# Patient Record
Sex: FEMALE | Race: WHITE | NOT HISPANIC OR LATINO
[De-identification: names, ages, dates, MRNs, and addresses within clinical notes are randomized per-mention and may not be internally consistent; named-entity substitution may affect disease eponyms.]

---

## 2020-03-09 ENCOUNTER — LABORATORY RESULT (OUTPATIENT)
Age: 55
End: 2020-03-09

## 2020-03-09 ENCOUNTER — APPOINTMENT (OUTPATIENT)
Dept: INTERNAL MEDICINE | Facility: CLINIC | Age: 55
End: 2020-03-09
Payer: COMMERCIAL

## 2020-03-09 ENCOUNTER — APPOINTMENT (OUTPATIENT)
Dept: INTERNAL MEDICINE | Facility: CLINIC | Age: 55
End: 2020-03-09

## 2020-03-09 VITALS
WEIGHT: 115 LBS | RESPIRATION RATE: 14 BRPM | HEIGHT: 65 IN | OXYGEN SATURATION: 97 % | DIASTOLIC BLOOD PRESSURE: 70 MMHG | SYSTOLIC BLOOD PRESSURE: 100 MMHG | HEART RATE: 97 BPM | BODY MASS INDEX: 19.16 KG/M2

## 2020-03-09 DIAGNOSIS — R63.4 ABNORMAL WEIGHT LOSS: ICD-10-CM

## 2020-03-09 DIAGNOSIS — G35 MULTIPLE SCLEROSIS: ICD-10-CM

## 2020-03-09 PROBLEM — Z00.00 ENCOUNTER FOR PREVENTIVE HEALTH EXAMINATION: Status: ACTIVE | Noted: 2020-03-09

## 2020-03-09 PROCEDURE — G0444 DEPRESSION SCREEN ANNUAL: CPT

## 2020-03-09 PROCEDURE — 99204 OFFICE O/P NEW MOD 45 MIN: CPT | Mod: 25

## 2020-03-09 PROCEDURE — 36415 COLL VENOUS BLD VENIPUNCTURE: CPT

## 2020-03-10 NOTE — REVIEW OF SYSTEMS
[Fever] : no fever [Vision Problems] : no vision problems [Nasal Discharge] : no nasal discharge [Chest Pain] : no chest pain [Shortness Of Breath] : no shortness of breath [Abdominal Pain] : no abdominal pain [Nausea] : no nausea [Constipation] : no constipation [Diarrhea] : diarrhea [Vomiting] : no vomiting [Skin Rash] : no skin rash [Headache] : no headache

## 2020-03-10 NOTE — PHYSICAL EXAM
[No Acute Distress] : no acute distress [Well Nourished] : well nourished [Well Developed] : well developed [Well-Appearing] : well-appearing [Normal Sclera/Conjunctiva] : normal sclera/conjunctiva [Normal Outer Ear/Nose] : the outer ears and nose were normal in appearance [Normal Oropharynx] : the oropharynx was normal [Normal TMs] : both tympanic membranes were normal [No Lymphadenopathy] : no lymphadenopathy [Supple] : supple [Thyroid Normal, No Nodules] : the thyroid was normal and there were no nodules present [No Respiratory Distress] : no respiratory distress  [No Accessory Muscle Use] : no accessory muscle use [Clear to Auscultation] : lungs were clear to auscultation bilaterally [Normal Rate] : normal rate  [Regular Rhythm] : with a regular rhythm [Normal S1, S2] : normal S1 and S2 [No Murmur] : no murmur heard [No Edema] : there was no peripheral edema [Normal Supraclavicular Nodes] : no supraclavicular lymphadenopathy [Normal Posterior Cervical Nodes] : no posterior cervical lymphadenopathy [Normal Anterior Cervical Nodes] : no anterior cervical lymphadenopathy [Normal Inguinal Nodes] : no inguinal lymphadenopathy [No Joint Swelling] : no joint swelling [No Rash] : no rash [Normal Gait] : normal gait [Speech Grossly Normal] : speech grossly normal [Memory Grossly Normal] : memory grossly normal [Normal Affect] : the affect was normal [Alert and Oriented x3] : oriented to person, place, and time [Normal Mood] : the mood was normal [Normal Insight/Judgement] : insight and judgment were intact

## 2020-03-10 NOTE — HEALTH RISK ASSESSMENT
[] : No [No] : In the past 12 months have you used drugs other than those required for medical reasons? No [No falls in past year] : Patient reported no falls in the past year [0] : 2) Feeling down, depressed, or hopeless: Not at all (0) [de-identified] : regular [[id number]] : 0

## 2020-03-10 NOTE — HISTORY OF PRESENT ILLNESS
[FreeTextEntry8] : JOSE LUIS BRADLEY is a 53 yo woman here for a first visit to discuss weight loss.  The patient reports that in the past 6 weeks, she has lost approximately 15 pounds.  She usually weighs approximately 130 pounds.  The patient reports decreased appetite and oral intake.  The patient denies headache, chest pain, sob, abdominal pain, black stool, blood in the stool, problems with bleeding.\par \par The patient was diagnosed with multiple sclerosis at age 35 after having double vision.  She sees neurologist Dr Keaton aMdrid.  Her last MRI brain was in approximately 10/19 and it did show some changes.  She is considering IV steroids.  She was on neurontin in the past\par \par Recently had labs with GI. Pt brings in copy for review\par \par The patient has a sleep disorder. In the past, it was improved with valium 10 mg qhs.  In recent times, she is having more difficulty sleeping\par \par The patient reports gyn is UTD.  The patient reports colonoscopy is utd with Dr Winn < 10 years ago.  The patient is due for mammogram\par \par The patient is  with 2 children. 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  Stopped working a few years ago so help care for her teenage son with autism

## 2020-03-11 ENCOUNTER — APPOINTMENT (OUTPATIENT)
Dept: ENDOCRINOLOGY | Facility: CLINIC | Age: 55
End: 2020-03-11
Payer: COMMERCIAL

## 2020-03-11 VITALS
HEIGHT: 65 IN | DIASTOLIC BLOOD PRESSURE: 72 MMHG | BODY MASS INDEX: 20.66 KG/M2 | OXYGEN SATURATION: 96 % | WEIGHT: 124 LBS | TEMPERATURE: 98.3 F | HEART RATE: 108 BPM | SYSTOLIC BLOOD PRESSURE: 118 MMHG

## 2020-03-11 DIAGNOSIS — R00.2 PALPITATIONS: ICD-10-CM

## 2020-03-11 DIAGNOSIS — R63.4 ABNORMAL WEIGHT LOSS: ICD-10-CM

## 2020-03-11 DIAGNOSIS — R45.89 OTHER SYMPTOMS AND SIGNS INVOLVING EMOTIONAL STATE: ICD-10-CM

## 2020-03-11 PROCEDURE — 99205 OFFICE O/P NEW HI 60 MIN: CPT

## 2020-03-16 LAB
B BURGDOR IGG+IGM SER QL IB: NORMAL
CORTICOSTEROID BIND GLOBULIN: 2.3 MG/DL
CORTIS SERPL-MCNC: 14 UG/DL
CORTISOL, FREE: 1.4 UG/DL
CRP SERPL-MCNC: <0.1 MG/DL
ERYTHROCYTE [SEDIMENTATION RATE] IN BLOOD BY WESTERGREN METHOD: 7 MM/HR
PFCX: 10 %

## 2020-03-20 ENCOUNTER — APPOINTMENT (OUTPATIENT)
Dept: INTERNAL MEDICINE | Facility: CLINIC | Age: 55
End: 2020-03-20

## 2020-03-31 PROBLEM — R63.4 WEIGHT LOSS: Status: ACTIVE | Noted: 2020-03-11

## 2020-03-31 PROBLEM — R45.89 FEELING ANXIOUS: Status: ACTIVE | Noted: 2020-03-31

## 2020-03-31 NOTE — HISTORY OF PRESENT ILLNESS
[FreeTextEntry1] : Ms. RAM  is a 54 year  year old female  who presents for initial endocrine evaluation. \par She has noted that she is waking up in the middle of the nighjt as if scared -"pulse of adrenalanin"  Tries to calm down-will take  valium.\par Has MS \par Lost 15 lbs in 4 weeks. 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  Help during the  day not so at night.\par \par The valium had been increased to2 daily even prior to  her episodesepisodes   For relaxation.\par Last 2 days pain rt lower ribs.\par Senies trauma\par Saw pmd who was a heme oncol-Dr Gonzalez  did ct chest abd pelvis.\par MS hx x 17 yrs.\par Adrenaline rush present this am too.It took 1/2 hr to settle down to normal.\par Recent cmp neg.TSH normal.\par Appetite down\par Eating a lot less than usual.\par \par \par \par

## 2021-01-04 ENCOUNTER — APPOINTMENT (OUTPATIENT)
Dept: UROGYNECOLOGY | Facility: CLINIC | Age: 56
End: 2021-01-04
Payer: COMMERCIAL

## 2021-01-04 ENCOUNTER — RESULT CHARGE (OUTPATIENT)
Age: 56
End: 2021-01-04

## 2021-01-04 VITALS
SYSTOLIC BLOOD PRESSURE: 102 MMHG | HEIGHT: 65 IN | DIASTOLIC BLOOD PRESSURE: 69 MMHG | BODY MASS INDEX: 18.16 KG/M2 | WEIGHT: 109 LBS

## 2021-01-04 DIAGNOSIS — R33.9 RETENTION OF URINE, UNSPECIFIED: ICD-10-CM

## 2021-01-04 DIAGNOSIS — R32 UNSPECIFIED URINARY INCONTINENCE: ICD-10-CM

## 2021-01-04 DIAGNOSIS — R39.13 SPLITTING OF URINARY STREAM: ICD-10-CM

## 2021-01-04 DIAGNOSIS — R39.15 URGENCY OF URINATION: ICD-10-CM

## 2021-01-04 DIAGNOSIS — N81.4 UTEROVAGINAL PROLAPSE, UNSPECIFIED: ICD-10-CM

## 2021-01-04 LAB
BILIRUB UR QL STRIP: NEGATIVE
CLARITY UR: CLEAR
COLLECTION METHOD: NORMAL
GLUCOSE UR-MCNC: NEGATIVE
HCG UR QL: 0.2 EU/DL
HGB UR QL STRIP.AUTO: NORMAL
KETONES UR-MCNC: NEGATIVE
LEUKOCYTE ESTERASE UR QL STRIP: NORMAL
NITRITE UR QL STRIP: NEGATIVE
PH UR STRIP: 5.5
PROT UR STRIP-MCNC: NEGATIVE
SP GR UR STRIP: 1.02

## 2021-01-04 PROCEDURE — 99204 OFFICE O/P NEW MOD 45 MIN: CPT | Mod: 25

## 2021-01-04 PROCEDURE — 99072 ADDL SUPL MATRL&STAF TM PHE: CPT

## 2021-01-04 NOTE — OB HISTORY
[Vaginal ___] : [unfilled] vaginal delivery(s) [Definite ___ (Date)] : the last menstrual period was [unfilled] [Last Pap Smear ___] : date of last pap smear was on [unfilled] [Abstaining d/t Present Problem] : abstaining due to present problem [Abnormal Pap Smear] : normal pap smear [Taking Estrogens] : is not taking estrogen replacement [Sexually Active] : is not sexually active [FreeTextEntry1] : Largest baby weighed 7#4oz.

## 2021-01-04 NOTE — HISTORY OF PRESENT ILLNESS
[FreeTextEntry1] : 54 yo  female with complaints of prolapse. This has been an ongoing issue for several years. Saw me about 5 years ago. The prolapse has been stable until the last year when it got worse. She also had an annual exam about 4 months ago and thinks the prolapse is even works since then. Feels discomfort with the prolapse. It is becoming very difficulty for her to reduce the prolapse now. Feels she is difficult emptying bladder. She is no longer leaking with sneeze or cough. Feels she voids frequently but she relates it to the amount she drinks. She tried a pessary on and off for some time. She put the pessary in this spring but it fell out when she would use the bathroom. Has to reduce the prolapse to have a BM.

## 2021-01-04 NOTE — PHYSICAL EXAM
[Chaperone Present] : A chaperone was present in the examining room during all aspects of the physical examination [No Acute Distress] : in no acute distress [Well developed] : well developed [Well Nourished] : ~L well nourished [Oriented x3] : oriented to person, place, and time [No Edema] : ~T edema was not present [Warm and Dry] : was warm and dry to touch [Normal Gait] : gait was normal [Normal Appearance] : general appearance was normal [Atrophy] : atrophy [Normal] : no abnormalities [Cough] : no cough [Tenderness] : ~T no ~M abdominal tenderness observed [Distended] : not distended [Hernia] : no hernia observed [Scar] : no scars [de-identified] : complete prolapse. Patient refusing to let me reduce her prolapse making it impossible to determine what her residual was.

## 2021-01-04 NOTE — DISCUSSION/SUMMARY
[FreeTextEntry1] : Ms. RAM is 55 with complete procidentia, prior leaking of urine, difficulty emptying her bladder or bowels, refusing full exam. We discussed that I would need to reduce her prolapse in order to fully assess her situation to determine what options she has. We talked about a different/large pessary and surgery. She is very concerned that she has cancer and I tried to reassure her. She had a pelvic U/S that was not available to me at the time of our consultation. Due to the severity of her prolapse I have some concerns regarding elevated PVR and possibly hydronephrosis. I recommended a CT scan of abd and pelvis. I also recommended some moisturizer like Aquaphor to apply to her prolapse to help moisturize her mucosa. We discussed what would be involved with a full exam. I suggested she think about it and return if she would like further treatment and evaluation. I was able to answer all her questions.

## 2021-01-04 NOTE — REASON FOR VISIT
[Pelvic Organ Prolapse] : pelvic organ prolapse [Urinary Incontinence] : urinary incontinence [Urinary Urgency] : urinary urgency

## 2021-01-08 ENCOUNTER — APPOINTMENT (OUTPATIENT)
Dept: UROGYNECOLOGY | Facility: CLINIC | Age: 56
End: 2021-01-08

## 2021-04-01 ENCOUNTER — INPATIENT (INPATIENT)
Facility: HOSPITAL | Age: 56
LOS: 10 days | Discharge: NURSING, REHAB OR LONG-TERM CARE | DRG: 299 | End: 2021-04-12
Attending: HOSPITALIST | Admitting: NEUROLOGICAL SURGERY
Payer: MEDICARE

## 2021-04-01 VITALS
DIASTOLIC BLOOD PRESSURE: 81 MMHG | OXYGEN SATURATION: 98 % | SYSTOLIC BLOOD PRESSURE: 137 MMHG | HEART RATE: 91 BPM | RESPIRATION RATE: 17 BRPM

## 2021-04-01 DIAGNOSIS — I77.74 DISSECTION OF VERTEBRAL ARTERY: ICD-10-CM

## 2021-04-01 LAB
ALBUMIN SERPL ELPH-MCNC: 4.1 G/DL — SIGNIFICANT CHANGE UP (ref 3.3–5)
ALP SERPL-CCNC: 59 U/L — SIGNIFICANT CHANGE UP (ref 40–120)
ALT FLD-CCNC: 27 U/L — SIGNIFICANT CHANGE UP (ref 10–45)
ANION GAP SERPL CALC-SCNC: 11 MMOL/L — SIGNIFICANT CHANGE UP (ref 5–17)
APTT BLD: 25 SEC — LOW (ref 27.5–35.5)
AST SERPL-CCNC: 43 U/L — HIGH (ref 10–40)
BASOPHILS # BLD AUTO: 0.05 K/UL — SIGNIFICANT CHANGE UP (ref 0–0.2)
BASOPHILS NFR BLD AUTO: 0.4 % — SIGNIFICANT CHANGE UP (ref 0–2)
BILIRUB SERPL-MCNC: 0.5 MG/DL — SIGNIFICANT CHANGE UP (ref 0.2–1.2)
BLD GP AB SCN SERPL QL: NEGATIVE — SIGNIFICANT CHANGE UP
BUN SERPL-MCNC: 25 MG/DL — HIGH (ref 7–23)
CALCIUM SERPL-MCNC: 9.2 MG/DL — SIGNIFICANT CHANGE UP (ref 8.4–10.5)
CHLORIDE SERPL-SCNC: 104 MMOL/L — SIGNIFICANT CHANGE UP (ref 96–108)
CO2 SERPL-SCNC: 25 MMOL/L — SIGNIFICANT CHANGE UP (ref 22–31)
CREAT SERPL-MCNC: 0.77 MG/DL — SIGNIFICANT CHANGE UP (ref 0.5–1.3)
EOSINOPHIL # BLD AUTO: 0.06 K/UL — SIGNIFICANT CHANGE UP (ref 0–0.5)
EOSINOPHIL NFR BLD AUTO: 0.4 % — SIGNIFICANT CHANGE UP (ref 0–6)
ETHANOL SERPL-MCNC: SIGNIFICANT CHANGE UP MG/DL (ref 0–10)
GAS PNL BLDV: SIGNIFICANT CHANGE UP
GLUCOSE SERPL-MCNC: 106 MG/DL — HIGH (ref 70–99)
HCT VFR BLD CALC: 42.1 % — SIGNIFICANT CHANGE UP (ref 34.5–45)
HGB BLD-MCNC: 13.6 G/DL — SIGNIFICANT CHANGE UP (ref 11.5–15.5)
IMM GRANULOCYTES NFR BLD AUTO: 0.8 % — SIGNIFICANT CHANGE UP (ref 0–1.5)
INR BLD: 0.98 RATIO — SIGNIFICANT CHANGE UP (ref 0.88–1.16)
LYMPHOCYTES # BLD AUTO: 1.95 K/UL — SIGNIFICANT CHANGE UP (ref 1–3.3)
LYMPHOCYTES # BLD AUTO: 14.4 % — SIGNIFICANT CHANGE UP (ref 13–44)
MCHC RBC-ENTMCNC: 30.8 PG — SIGNIFICANT CHANGE UP (ref 27–34)
MCHC RBC-ENTMCNC: 32.3 GM/DL — SIGNIFICANT CHANGE UP (ref 32–36)
MCV RBC AUTO: 95.5 FL — SIGNIFICANT CHANGE UP (ref 80–100)
MONOCYTES # BLD AUTO: 0.86 K/UL — SIGNIFICANT CHANGE UP (ref 0–0.9)
MONOCYTES NFR BLD AUTO: 6.4 % — SIGNIFICANT CHANGE UP (ref 2–14)
NEUTROPHILS # BLD AUTO: 10.49 K/UL — HIGH (ref 1.8–7.4)
NEUTROPHILS NFR BLD AUTO: 77.6 % — HIGH (ref 43–77)
NRBC # BLD: 0 /100 WBCS — SIGNIFICANT CHANGE UP (ref 0–0)
PLATELET # BLD AUTO: 275 K/UL — SIGNIFICANT CHANGE UP (ref 150–400)
POTASSIUM SERPL-MCNC: 4.5 MMOL/L — SIGNIFICANT CHANGE UP (ref 3.5–5.3)
POTASSIUM SERPL-SCNC: 4.5 MMOL/L — SIGNIFICANT CHANGE UP (ref 3.5–5.3)
PROT SERPL-MCNC: 6.7 G/DL — SIGNIFICANT CHANGE UP (ref 6–8.3)
PROTHROM AB SERPL-ACNC: 11.8 SEC — SIGNIFICANT CHANGE UP (ref 10.6–13.6)
RBC # BLD: 4.41 M/UL — SIGNIFICANT CHANGE UP (ref 3.8–5.2)
RBC # FLD: 13.2 % — SIGNIFICANT CHANGE UP (ref 10.3–14.5)
RH IG SCN BLD-IMP: POSITIVE — SIGNIFICANT CHANGE UP
SODIUM SERPL-SCNC: 140 MMOL/L — SIGNIFICANT CHANGE UP (ref 135–145)
WBC # BLD: 13.52 K/UL — HIGH (ref 3.8–10.5)
WBC # FLD AUTO: 13.52 K/UL — HIGH (ref 3.8–10.5)

## 2021-04-01 PROCEDURE — 70498 CT ANGIOGRAPHY NECK: CPT | Mod: 26,MA

## 2021-04-01 PROCEDURE — 99291 CRITICAL CARE FIRST HOUR: CPT

## 2021-04-01 PROCEDURE — 70450 CT HEAD/BRAIN W/O DYE: CPT | Mod: 26,MD

## 2021-04-01 PROCEDURE — 72125 CT NECK SPINE W/O DYE: CPT | Mod: 26,MA

## 2021-04-01 RX ORDER — ASPIRIN/CALCIUM CARB/MAGNESIUM 324 MG
324 TABLET ORAL ONCE
Refills: 0 | Status: DISCONTINUED | OUTPATIENT
Start: 2021-04-01 | End: 2021-04-01

## 2021-04-01 NOTE — ED ADULT NURSE NOTE - CHPI ED NUR SYMPTOMS NEG
no blurred vision/no change in level of consciousness/no confusion/no dizziness/no fever/no loss of consciousness/no nausea
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
## 2021-04-01 NOTE — ED PROVIDER NOTE - NS ED ATTENDING STATEMENT MOD
I have personally provided the amount of critical care time documented below concurrently with the resident/fellow.  This time excludes time spent on separate procedures and time spent teaching. I have reviewed the resident’s / fellow’s documentation and I agree with the history, exam, and assessment and plan of care. 143.9

## 2021-04-01 NOTE — ED PROVIDER NOTE - PHYSICAL EXAMINATION
on supine position, GCS 15, appears mildly in pain  clear lungs, S1-2  soft abd non tender, intact pulses x4  no deformity of ext  neuro exam notable for intact cranial nerves, reports loss of sens from neck down, respond to painful stimuli in x4 but does not move ext.
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
## 2021-04-01 NOTE — ED PROVIDER NOTE - CLINICAL SUMMARY MEDICAL DECISION MAKING FREE TEXT BOX
55yr F hx of MS p/w neck injury. ? spinal shock vs ant cord. will do emergent CT w Angio. if evidence of airway compromise will intubate. plan to do NIF.

## 2021-04-01 NOTE — CONSULT NOTE ADULT - ASSESSMENT
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

## 2021-04-01 NOTE — ED ADULT NURSE REASSESSMENT NOTE - NS ED NURSE REASSESS COMMENT FT1
Neuro Surgical team at bedside with pt, pt updated on plan of care, pt neuro exam remains as previously assessed, pt AOx4, pupils equal and reactive to light, pt right and left upper extremity  strength weak, pt lower extremity flexion weak

## 2021-04-01 NOTE — ED PROVIDER NOTE - PROGRESS NOTE DETAILS
Lance LI: Received sign out from my colleague Dr. Higgins, pt presents s/p hyperextension neck injury with c/f functional paralysis on exam found to have vertebral artery dissection, after discussion w trauma and nsurg will admit to nsurg icu, get stat MRI, radiology made aware, will hold pt in ED until MRI completed, will maintain cervical spine precautions and q1 neuro checks, hold asa for now. Marck, PGY3- (back-charting- 21:35)- after initial eval concern for spinal shock/cervical cord injury. Spoke with trauma surgery on phone who would come eval/review CTs. Spoke with NSG in ER who said they would evaluate shortly after dealing with operative emergency in NSICU. Due to pt c/o difficulty breathing, NIF ordered. RT entered room and tried to obtain but pt refusing to cooperate with test. Resp status reassuring during serial examinations. Dispo per specialist recommendations/imaging.
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
## 2021-04-02 DIAGNOSIS — Z71.89 OTHER SPECIFIED COUNSELING: ICD-10-CM

## 2021-04-02 DIAGNOSIS — I77.74 DISSECTION OF VERTEBRAL ARTERY: ICD-10-CM

## 2021-04-02 DIAGNOSIS — Z51.5 ENCOUNTER FOR PALLIATIVE CARE: ICD-10-CM

## 2021-04-02 DIAGNOSIS — R41.89 OTHER SYMPTOMS AND SIGNS INVOLVING COGNITIVE FUNCTIONS AND AWARENESS: ICD-10-CM

## 2021-04-02 DIAGNOSIS — R53.2 FUNCTIONAL QUADRIPLEGIA: ICD-10-CM

## 2021-04-02 LAB
ANION GAP SERPL CALC-SCNC: 10 MMOL/L — SIGNIFICANT CHANGE UP (ref 5–17)
BUN SERPL-MCNC: 17 MG/DL — SIGNIFICANT CHANGE UP (ref 7–23)
CALCIUM SERPL-MCNC: 7.2 MG/DL — LOW (ref 8.4–10.5)
CHLORIDE SERPL-SCNC: 111 MMOL/L — HIGH (ref 96–108)
CO2 SERPL-SCNC: 21 MMOL/L — LOW (ref 22–31)
CREAT SERPL-MCNC: 0.54 MG/DL — SIGNIFICANT CHANGE UP (ref 0.5–1.3)
GLUCOSE SERPL-MCNC: 94 MG/DL — SIGNIFICANT CHANGE UP (ref 70–99)
HCT VFR BLD CALC: 34.3 % — LOW (ref 34.5–45)
HGB BLD-MCNC: 10.8 G/DL — LOW (ref 11.5–15.5)
MAGNESIUM SERPL-MCNC: 1.8 MG/DL — SIGNIFICANT CHANGE UP (ref 1.6–2.6)
MCHC RBC-ENTMCNC: 30.4 PG — SIGNIFICANT CHANGE UP (ref 27–34)
MCHC RBC-ENTMCNC: 31.5 GM/DL — LOW (ref 32–36)
MCV RBC AUTO: 96.6 FL — SIGNIFICANT CHANGE UP (ref 80–100)
NRBC # BLD: 0 /100 WBCS — SIGNIFICANT CHANGE UP (ref 0–0)
PHOSPHATE SERPL-MCNC: 2.1 MG/DL — LOW (ref 2.5–4.5)
PLATELET # BLD AUTO: 191 K/UL — SIGNIFICANT CHANGE UP (ref 150–400)
POTASSIUM SERPL-MCNC: 3.5 MMOL/L — SIGNIFICANT CHANGE UP (ref 3.5–5.3)
POTASSIUM SERPL-SCNC: 3.5 MMOL/L — SIGNIFICANT CHANGE UP (ref 3.5–5.3)
RBC # BLD: 3.55 M/UL — LOW (ref 3.8–5.2)
RBC # FLD: 13.4 % — SIGNIFICANT CHANGE UP (ref 10.3–14.5)
SARS-COV-2 RNA SPEC QL NAA+PROBE: SIGNIFICANT CHANGE UP
SODIUM SERPL-SCNC: 142 MMOL/L — SIGNIFICANT CHANGE UP (ref 135–145)
WBC # BLD: 9.53 K/UL — SIGNIFICANT CHANGE UP (ref 3.8–10.5)
WBC # FLD AUTO: 9.53 K/UL — SIGNIFICANT CHANGE UP (ref 3.8–10.5)

## 2021-04-02 PROCEDURE — 93970 EXTREMITY STUDY: CPT | Mod: 26

## 2021-04-02 PROCEDURE — 72141 MRI NECK SPINE W/O DYE: CPT | Mod: 26

## 2021-04-02 PROCEDURE — 51702 INSERT TEMP BLADDER CATH: CPT

## 2021-04-02 PROCEDURE — 99497 ADVNCD CARE PLAN 30 MIN: CPT | Mod: 25

## 2021-04-02 PROCEDURE — 70549 MR ANGIOGRAPH NECK W/O&W/DYE: CPT | Mod: 26

## 2021-04-02 PROCEDURE — 70544 MR ANGIOGRAPHY HEAD W/O DYE: CPT | Mod: 26,59

## 2021-04-02 PROCEDURE — 99232 SBSQ HOSP IP/OBS MODERATE 35: CPT

## 2021-04-02 PROCEDURE — 70551 MRI BRAIN STEM W/O DYE: CPT | Mod: 26

## 2021-04-02 PROCEDURE — 99291 CRITICAL CARE FIRST HOUR: CPT

## 2021-04-02 PROCEDURE — 99223 1ST HOSP IP/OBS HIGH 75: CPT | Mod: 25

## 2021-04-02 RX ORDER — POLYETHYLENE GLYCOL 3350 17 G/17G
17 POWDER, FOR SOLUTION ORAL EVERY 12 HOURS
Refills: 0 | Status: DISCONTINUED | OUTPATIENT
Start: 2021-04-02 | End: 2021-04-12

## 2021-04-02 RX ORDER — HYDROMORPHONE HYDROCHLORIDE 2 MG/ML
0.5 INJECTION INTRAMUSCULAR; INTRAVENOUS; SUBCUTANEOUS ONCE
Refills: 0 | Status: DISCONTINUED | OUTPATIENT
Start: 2021-04-02 | End: 2021-04-03

## 2021-04-02 RX ORDER — ONDANSETRON 8 MG/1
4 TABLET, FILM COATED ORAL EVERY 6 HOURS
Refills: 0 | Status: DISCONTINUED | OUTPATIENT
Start: 2021-04-02 | End: 2021-04-12

## 2021-04-02 RX ORDER — SODIUM CHLORIDE 9 MG/ML
1000 INJECTION INTRAMUSCULAR; INTRAVENOUS; SUBCUTANEOUS
Refills: 0 | Status: DISCONTINUED | OUTPATIENT
Start: 2021-04-02 | End: 2021-04-03

## 2021-04-02 RX ORDER — SODIUM CHLORIDE 9 MG/ML
500 INJECTION INTRAMUSCULAR; INTRAVENOUS; SUBCUTANEOUS ONCE
Refills: 0 | Status: COMPLETED | OUTPATIENT
Start: 2021-04-02 | End: 2021-04-02

## 2021-04-02 RX ORDER — HEPARIN SODIUM 5000 [USP'U]/ML
5000 INJECTION INTRAVENOUS; SUBCUTANEOUS EVERY 8 HOURS
Refills: 0 | Status: DISCONTINUED | OUTPATIENT
Start: 2021-04-03 | End: 2021-04-03

## 2021-04-02 RX ORDER — OXYCODONE HYDROCHLORIDE 5 MG/1
10 TABLET ORAL EVERY 6 HOURS
Refills: 0 | Status: DISCONTINUED | OUTPATIENT
Start: 2021-04-02 | End: 2021-04-09

## 2021-04-02 RX ORDER — CHLORHEXIDINE GLUCONATE 213 G/1000ML
1 SOLUTION TOPICAL
Refills: 0 | Status: DISCONTINUED | OUTPATIENT
Start: 2021-04-02 | End: 2021-04-03

## 2021-04-02 RX ORDER — SENNA PLUS 8.6 MG/1
2 TABLET ORAL AT BEDTIME
Refills: 0 | Status: DISCONTINUED | OUTPATIENT
Start: 2021-04-02 | End: 2021-04-02

## 2021-04-02 RX ORDER — DIAZEPAM 5 MG
5 TABLET ORAL EVERY 6 HOURS
Refills: 0 | Status: DISCONTINUED | OUTPATIENT
Start: 2021-04-02 | End: 2021-04-06

## 2021-04-02 RX ORDER — OXYCODONE HYDROCHLORIDE 5 MG/1
5 TABLET ORAL EVERY 6 HOURS
Refills: 0 | Status: DISCONTINUED | OUTPATIENT
Start: 2021-04-02 | End: 2021-04-09

## 2021-04-02 RX ORDER — ACETAMINOPHEN 500 MG
1000 TABLET ORAL ONCE
Refills: 0 | Status: COMPLETED | OUTPATIENT
Start: 2021-04-02 | End: 2021-04-02

## 2021-04-02 RX ORDER — ACETAMINOPHEN 500 MG
650 TABLET ORAL EVERY 6 HOURS
Refills: 0 | Status: DISCONTINUED | OUTPATIENT
Start: 2021-04-02 | End: 2021-04-12

## 2021-04-02 RX ORDER — SENNA PLUS 8.6 MG/1
2 TABLET ORAL AT BEDTIME
Refills: 0 | Status: DISCONTINUED | OUTPATIENT
Start: 2021-04-02 | End: 2021-04-12

## 2021-04-02 RX ORDER — MIDODRINE HYDROCHLORIDE 2.5 MG/1
5 TABLET ORAL EVERY 8 HOURS
Refills: 0 | Status: DISCONTINUED | OUTPATIENT
Start: 2021-04-02 | End: 2021-04-03

## 2021-04-02 RX ORDER — GABAPENTIN 400 MG/1
300 CAPSULE ORAL EVERY 8 HOURS
Refills: 0 | Status: DISCONTINUED | OUTPATIENT
Start: 2021-04-02 | End: 2021-04-07

## 2021-04-02 RX ORDER — PHENYLEPHRINE HYDROCHLORIDE 10 MG/ML
0.1 INJECTION INTRAVENOUS
Qty: 160 | Refills: 0 | Status: DISCONTINUED | OUTPATIENT
Start: 2021-04-02 | End: 2021-04-02

## 2021-04-02 RX ADMIN — SODIUM CHLORIDE 2000 MILLILITER(S): 9 INJECTION INTRAMUSCULAR; INTRAVENOUS; SUBCUTANEOUS at 07:19

## 2021-04-02 RX ADMIN — SODIUM CHLORIDE 75 MILLILITER(S): 9 INJECTION INTRAMUSCULAR; INTRAVENOUS; SUBCUTANEOUS at 05:04

## 2021-04-02 RX ADMIN — Medication 400 MILLIGRAM(S): at 03:45

## 2021-04-02 RX ADMIN — SODIUM CHLORIDE 500 MILLILITER(S): 9 INJECTION INTRAMUSCULAR; INTRAVENOUS; SUBCUTANEOUS at 05:25

## 2021-04-02 RX ADMIN — Medication 1000 MILLIGRAM(S): at 04:15

## 2021-04-02 RX ADMIN — POLYETHYLENE GLYCOL 3350 17 GRAM(S): 17 POWDER, FOR SOLUTION ORAL at 18:42

## 2021-04-02 NOTE — OCCUPATIONAL THERAPY INITIAL EVALUATION ADULT - RANGE OF MOTION EXAMINATION, UPPER EXTREMITY
shoulder flexion/abduction to 90 due to spine precautions/bilateral UE Passive ROM was WNL (within normal limits)

## 2021-04-02 NOTE — OCCUPATIONAL THERAPY INITIAL EVALUATION ADULT - DIAGNOSIS, OT EVAL
Patient with deficits in ADL status and functional mobility due to impaired sensation, balance, strength, ROM, coordination

## 2021-04-02 NOTE — PROGRESS NOTE ADULT - ASSESSMENT
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  r/o thoracic epidural subdural hematoma  r/o cord contusion   Neuro: neuro checks q 1 hr  Emergent MRI brain, C-T spine   target MAP> 85 mmhg   consider decadron depending on the MRI results  has MS but no residual weakness and is not on any medication   Respiratory: RA, monitor respiratory status given high spine injury  feels SOB  CV: MAP goal > 85 mmhg  needs a line  ECG   Endocrine: target euglycemia    Heme/Onc:             DVT ppx: SCD hold chemoprophylaxis until MRI results   Renal: NS 75 ml/hr  urinary retention, guerrero to be placed by urology given uterine proplapse   ID: [email or web address]   GI: NPO for now in case of OR, senna, miralax   ensure BM   Social/Family:  updated   Discharge planning:     Code Status: [x] Full Code [] DNR [] DNI [] Goals of Care:   Disposition: [x] ICU [] Stroke Unit [] RCU []PCU []Floor [] Discharge Home     Patient at high risk for neurologic deterioration respiratory failure, DVT, PE    critical care time, excluding procedures: 40 minutes                      A/P:  55F w/ pmh Multiple sclerosis now quadriplegic post trauma, CT cevrical spine no fracture  CTA 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  r/o thoracic epidural subdural hematoma  r/o cord contusion     Neuro: neuro checks q 1 hr  Emergent MRI brain, C-T spine   target MAP> 85 mmhg - however patient refusing IV pressors   consider decadron depending on the MRI results  has MS but no residual weakness and is not on any medication   psych c/s for   PT/OT    Respiratory: RA, monitor respiratory status given high spine injury  subjective SOB- however saturating well     CV: MAP goal > 85 mmhg  needs a line - however refusing       Endocrine: target euglycemia      Heme/Onc:               DVT ppx: SCD hold chemoprophylaxis until MRI results, anticipate heparin sq      Renal: NS 75 ml/hr  urinary retention, guerrero to be placed by urology given uterine prolapse   c/s GYN    ID: [email or web address]     GI: NPO for now speech & swallow c/s   senna, miralax   ensure BM     Social/Family:  updated     Discharge planning:   Code Status: [x] Full Code [] DNR [] DNI [] Goals of Care: palliative   Disposition: [x] ICU [] Stroke Unit [] RCU []PCU []Floor [] Discharge Home     Patient at high risk for neurologic deterioration respiratory failure, DVT, PE    critical care time, excluding procedures: 40 minutes                      A/P:  55F w/ pmh Multiple sclerosis now quadriplegic post trauma, CT cevrical spine no fracture  CTA 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  r/o thoracic epidural subdural hematoma  r/o cord contusion     Neuro: neuro checks q 1 hr  Emergent MRI brain, C-T spine   target MAP> 85 mmhg - however patient refusing IV pressors   consider decadron depending on the MRI results  has MS but no residual weakness and is not on any medication   psych c/s for   PT/OT    Respiratory: RA, monitor respiratory status given high spine injury  subjective SOB- however saturating well     CV: MAP goal > 85 mmhg  needs a line - however refusing       Endocrine: target euglycemia      Heme/Onc:               DVT ppx: SCD hold chemoprophylaxis until MRI results, anticipate heparin sq      Renal: NS 75 ml/hr  urinary retention, guerrero to be placed by urology given uterine prolapse   c/s GYN    ID: afebrile     GI: NPO for now speech & swallow c/s   senna, miralax   ensure BM     Social/Family:  updated     Discharge planning:   Code Status: [x] Full Code [] DNR [] DNI [] Goals of Care: palliative   Disposition: [x] ICU [] Stroke Unit [] RCU []PCU []Floor [] Discharge Home     Patient at high risk for neurologic deterioration respiratory failure, DVT, PE    critical care time, excluding procedures: 45 minutes

## 2021-04-02 NOTE — CONSULT NOTE ADULT - ASSESSMENT
54 yo with MS p/w neck pain and numbness post trauma.  Pt now quadriplegic 2/2 to injury.  Palliative care called for GOC.

## 2021-04-02 NOTE — BEHAVIORAL HEALTH ASSESSMENT NOTE - SUMMARY
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    On interview today, pt. is generally calm and cooperative. She appears to understand the severity of her illness but does not fully comprehend the importance of potentially life-saving vasopressors out of concern for raising her blood pressure. Pt. is fixated on attempting to treat herself via fluid boluses before starting medications. She additionally appears adamant about signing a DNR/DNI, states she does not want anything about her capacity to be documented because "it will affect my ability to sign the DNR/DNI" although she had signed the DNR/DNI form prior to the interview. Pt. denies current SI/HI, perceptual disturbances including AH/VH, and delusions. At this time, the patient has partial insight regarding treatment and thus lacks the capacity to refuse potentially life-saving therapy. She does, however, have capacity to assign a healthcare proxy and have a DNR/DNI. 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  On interview today, pt. is generally calm and cooperative. She is able to answer questions though is unable to acknowledge the severity of her illness and seems to minimize the importance of potentially life-saving vasopressors out of concern for raising her blood pressure. Pt. is fixated on attempting to treat herself via fluid boluses before starting medications. She additionally appears to be perseverating about signing a DNR/DNI, stating that she does not want anything about her capacity to be documented because "it will affect my ability to sign the DNR/DNI" not able to recall that she had signed the DNR/DNI form prior to the interview. Pt. denies current SI/HI, perceptual disturbances including AH/VH, and delusions though her  expressed concerns that she had become increasingly paranoid over the past year.  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    Patient is able to sign for her DNR/DNI order and is able to designate a proxy (already has proxy papers designating her  as her proxy), but she lacks capacity to participate in medical and surgical decisions and in decisions regarding discharge planning.  Her  is aware and will be her proxy to sign consent for her treatment.

## 2021-04-02 NOTE — PHYSICAL THERAPY INITIAL EVALUATION ADULT - PRECAUTIONS/LIMITATIONS, REHAB EVAL
Had son call father who called ambulance and brought patient to [facility]. States she cannot feel anything below her neck and has complete loss of motor function below this level with associated neck pain. States she currently is not taking medications for MS, has not had recent neurologic MS related episode. 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 Pt admitted to NSCU awaiting surgical plan. Cervical Collar to be worn at all times* MR Cervical Spine 4/2/21: Focal T2/STIR hyperintense signal in the C3-C4 cord with associated cord expansion and gradient susceptibility is in keeping with hemorrhagic cord contusion, consistent with hyperextension injury. Central cord syndrome is likely present. T2/STIR hyperintense signal along the C1-C4 spinous processes indicates interspinous ligamentous injury. Small prevertebral fluid measures up to 4 mm in thickness from C1-C5. Loss of normal T2 flow void with associated T1 hyperintense signal in the mid to distal cervical right vertebral artery is in keeping with known vertebral artery dissection. C5-C6 disc degeneration with loss of disc height, uncovertebral joint spurring indents the ventral thecal sac and contacts the ventral surface of the cord with moderate central canal stenosis and bilateral high-grade bilateral foraminal stenosis. Had son call father who called ambulance and brought patient to [facility]. States she cannot feel anything below her neck and has complete loss of motor function below this level with associated neck pain. States she currently is not taking medications for MS, has not had recent neurologic MS related episode. 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 Pt admitted to NSCU awaiting surgical plan. Cervical Collar to be worn at all times* MR Cervical Spine 4/2/21: Focal T2/STIR hyperintense signal in the C3-C4 cord with associated cord expansion and gradient susceptibility is in keeping with hemorrhagic cord contusion, consistent with hyperextension injury. Central cord syndrome is likely present. T2/STIR hyperintense signal along the C1-C4 spinous processes indicates interspinous ligamentous injury. Small prevertebral fluid measures up to 4 mm in thickness from C1-C5. Loss of normal T2 flow void with associated T1 hyperintense signal in the mid to distal cervical right vertebral artery is in keeping with known vertebral artery dissection. C5-C6 disc degeneration with loss of disc height, uncovertebral joint spurring indents the ventral thecal sac and contacts the ventral surface of the cord with moderate central canal stenosis and bilateral high-grade bilateral foraminal stenosis./fall precautions

## 2021-04-02 NOTE — OCCUPATIONAL THERAPY INITIAL EVALUATION ADULT - BALANCE DISTURBANCE, IDENTIFIED IMPAIRMENT CONTRIBUTE, REHAB EVAL
impaired coordination/abnormal muscle tone/decreased ROM/decreased sensation/impaired sensory feedback/decreased strength

## 2021-04-02 NOTE — PHYSICAL THERAPY INITIAL EVALUATION ADULT - PASSIVE RANGE OF MOTION EXAMINATION, REHAB EVAL
bilateral upper extremity Passive ROM was WFL (within functional limits)/bilateral lower extremity Passive ROM was WFL (within functional limits) 4/6/21 pt with min shoulder shrugs while in [employer] pad w/ OT support in back support back and PT support in front , min sh retraction 1/5 with 1x at 2-/5 ; + toe wiggles L foot x 3/bilateral upper extremity Passive ROM was WFL (within functional limits)/bilateral lower extremity Passive ROM was WFL (within functional limits)

## 2021-04-02 NOTE — SWALLOW BEDSIDE ASSESSMENT ADULT - ASR SWALLOW ASPIRATION MONITOR
Monitor for s/s aspiration/laryngeal penetration. If noted:  D/C p.o. intake, provide non-oral nutrition/hydration/meds, and contact this service @ [phone]/change of breathing pattern/cough/gurgly voice/fever/pneumonia/throat clearing/upper respiratory infection

## 2021-04-02 NOTE — PHYSICAL THERAPY INITIAL EVALUATION ADULT - LEVEL OF INDEPENDENCE: SIT/SUPINE, REHAB EVAL
use of [employer] lift sitting on EOB in [employer] pad with OT support (Ashlie) in back and PT support up front holding [employer] pad , OT support head + cervical collar all times/dependent (less than 25% patients effort)

## 2021-04-02 NOTE — OCCUPATIONAL THERAPY INITIAL EVALUATION ADULT - LIVES WITH, PROFILE
Pvt home, 4 steps to enter without handrail. 1 flight to bed and bath. +Stall shower. Owns rolling walker and straight cane, however not used PTA. (I) ADLs and functional transfers. +Stall shower with shower chair./children/spouse
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
## 2021-04-02 NOTE — PHYSICAL THERAPY INITIAL EVALUATION ADULT - BED MOBILITY LIMITATIONS, REHAB EVAL
decreased ability to use arms for pushing/pulling/decreased ability to use legs for bridging/pushing PT/OT present as well as PT AIDE/decreased ability to use arms for pushing/pulling/decreased ability to use legs for bridging/pushing
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
## 2021-04-02 NOTE — PHYSICAL THERAPY INITIAL EVALUATION ADULT - REFERRING PHYSICIAN, REHAB EVAL
Fofana; PT Evaluate & Treat, Activity-Ambulate as Tolerated Fofana; PT Evaluate & Treat, Activity-Ambulate as Tolerated ( NOTE B Below knee DVT 4/2/21 may need IVC filter )vascular to see )

## 2021-04-02 NOTE — PHYSICAL THERAPY INITIAL EVALUATION ADULT - MUSCLE TONE ASSESSMENT, REHAB EVAL
L LE/moderately increased tone L LE; 4/6/21 today L Le mild tone less than yesterday  + spasm L LE with jerk of LE x 1/moderately increased tone
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
## 2021-04-02 NOTE — OCCUPATIONAL THERAPY INITIAL EVALUATION ADULT - REFERRAL TO ANOTHER SERVICE NEEDED, OT EVAL
MR 4/2-Redemonstration absent flow signal distal right V2 and right V3 vertebral artery segments of vessel irregularity at C2-3 and C3-5-6 levels, pseudoaneurysms not excluded, concerning for thrombosis and/or dissection. There is decreased flow signal of the right vertebral artery at the proximal right V4 segment with reconstitution of distal right V4 segment via left vertebral artery and vertebrobasilar confluence. Abnormal 0.57 x 0.95 x 1.3 cm, AP, TR, CC (6:14, 3:8 5:8),  decreased T1 and hyperintense T2 and STIR signal with susceptibility on axial GRE (7:16), within the intramedullary cervical spinal cord at the C3-4 level concerning for acute ischemic change with petechial hemorrhagic transformation, other etiologies including atypical demyelination with hemorrhage not excluded, although considered less likely. Restricted diffusion with DWI hyperintensity along the bilateral medulla near the olives (4:8, 400:8), without significant T2 or corresponding FLAIR hyperintensity, with susceptibility along the upper cervicomedullary junction and cord on SWI sequences, small foci of developing ischemia not excluded. Intracranial volume loss, nonspecific T2 and FLAIR matter hyperintensity likely sequela of microvascular disease,  additional foci radiating away from ventricles in a perpendicular fashion with a central vein sign, involving corpus callosum likely foci of of demyelination, no intracranial hemorrhage or midline shift. Decreased T1, hyperintense T2 and STIR signal in prevertebral soft tissues concerning for edema and/or hemorrhage disruption anterior longitudinal ligament at C2 level (3:8). Multilevel degenerative changes  in the cervical spine.

## 2021-04-02 NOTE — CONSULT NOTE ADULT - ASSESSMENT
54yo  postmenopausal woman with hx of MS admitted with spinal cord injury now quadriplegic. Urogyn consulted for complete procidentia.  -patient is not allowing full examination or reduction  -explained to patient that prolapse would continue to occur unless pessary was fitted which patient is refusing  -explained that catheter is adequately draining bladder and should not be removed and she will not be able to void. Explained that procidentia is not life threatening and there is no sign of DONAVON  -can perform abdominal ultrasound to assess for hydronephrosis  -patient states that if she recovers she would like to be sexually active  -she is signing DNR/DNI paperwork at this time  -at this time would recommend no acute intervention. Aquaphor can be placed over the exposed tissue  -please call with questions or concerns    TBS by urogyn fellow  [facility] Trevizo PGY-3  [phone] 56yo  postmenopausal woman with hx of MS admitted with spinal cord injury now quadriplegic. Urogyn consulted for complete procidentia.  -patient is refusing full examination or reduction  -explained to patient that prolapse would continue to occur unless pessary was fitted which patient is refusing  -explained that catheter is adequately draining bladder and should not be removed and she will not be able to void. Explained that procidentia is not life threatening and there is no sign of DONAVON  -can perform abdominal ultrasound or CT abd/pelvis to assess for hydronephrosis  -patient states that if she recovers she would like to be sexually active  -she is signing DNR/DNI paperwork at this time  -at this time would recommend no acute intervention. Aquaphor can be placed over the exposed tissue  -please call with questions or concerns    TBS by urogyn fellow  [facility] Trevizo PGY-3  [phone] 54yo  postmenopausal woman with hx of MS admitted with spinal cord injury now quadriplegic. Urogyn consulted for complete procidentia.  -patient is refusing full examination or reduction  -explained to patient that prolapse would continue to occur unless pessary was fitted which patient is refusing  -explained that catheter is adequately draining bladder and should not be removed and she will not be able to void. Explained that procidentia is not life threatening and there is no sign of DONAVON  -can perform abdominal ultrasound or CT abd/pelvis to assess for hydronephrosis  -patient states that if she recovers she would like to be sexually active  -she is signing DNR/DNI paperwork at this time  -at this time would recommend no acute intervention. Aquaphor can be placed over the exposed tissue  -please call with questions or concerns    TBS by urogyn fellow  [facility] Trevizo PGY-3  [phone]        Urogyn Attdending:  Agree with Dr. Trevizo's Resident note and Dr. Longoria's Fellow notes. Patient with known uterine prolapse, hx MS, now quadriplegic s/p injury. Patient's post-void bladder residual volume in  was normal. She was seen in the outpatient setting earlier in  at which time she declined a Urogyn exam. At present, the main goal from a Urogyn perspective is to drain the bladder to avoid retention with potential for upper urinary tract renal damage in the long-term. Reducing bladder with a pessary may facilitate this. Sanchez cath drainage should remain. Please reconsult as needed.  Keith Haider MD  [phone] (cell)

## 2021-04-02 NOTE — SWALLOW BEDSIDE ASSESSMENT ADULT - SWALLOW EVAL: DIAGNOSIS
Chart reviewed, attempted to see pt for bedside swallow evaluation however NSX PA: Andry requesting deferral of exam pending further discussion of POC with pt. This service will f/u as clinically appropriate. Pt is a 56 y/o F admitted with cervical spinal cord injury with no movement from the level of the neck down. Pt now presenting with a suspected pharyngeal dysphagia marked by subtle throat clears post PO intake of thin liquids suggestive of possible laryngeal penetration/aspiration vs pt reported post nasal drip.

## 2021-04-02 NOTE — ED ADULT NURSE REASSESSMENT NOTE - NS ED NURSE REASSESS COMMENT FT1
Pt placed in full spinal precautions, laying flat on back. Instructed why pt needs to be placed in position due to spinal injury. Pt asking if she can refuse positioning and to speak to MD. MD Montoya made aware. Pt states she is having bladder "spasms" but does not have the urge to urinate spontaneously. Sanchez order placed, however when RN assessed perineal area, pt has completely prolapsed cervix. Pt states OBGYN had to place Sanchez last time. MD Montoya made aware that RN does not feel comfortable placing Sanchez at this time. Rapid Abbot COVID swab sent to lab. MRI screening forms printed and being completed with RN at bedside. Per MD pt to be started on pressors to maintain MAP over 85 for spinal precautions. Bed locked and lowered. Comfort and safety measures maintained.

## 2021-04-02 NOTE — ED ADULT NURSE REASSESSMENT NOTE - NS ED NURSE REASSESS COMMENT FT1
Pt being to be transported to MRI prior to NSCU. Report called to NSCU Saloni Augustine aware of plan of care. Pt to be transported with ED Tech and TONY Elmore to MRI. Chart with TONY Elmore. Neuro team attempted to place Sanchez and pt spontaneously had urine output, however pt states still having the urge to urinate. Urology now at bedside to attempt Sanchez placement. Pt to be transported after Sancehz is placed.

## 2021-04-02 NOTE — BEHAVIORAL HEALTH ASSESSMENT NOTE - NSBHCONSULTFOLLOWAFTERCARE_PSY_A_CORE FT
pt may be discharge home to her family once she is medically clear but she may not leave AMA.  She will need psychiatric follow up. And may be seen by the walk in clinic at [facility] [phone]

## 2021-04-02 NOTE — BEHAVIORAL HEALTH ASSESSMENT NOTE - NSBHCHARTREVIEWVS_PSY_A_CORE FT
Vital Signs Last 24 Hrs  T(C): 37 (02 Apr 2021 11:00), Max: 37.3 (02 Apr 2021 03:35)  T(F): 98.6 (02 Apr 2021 11:00), Max: 99.1 (02 Apr 2021 03:35)  HR: 77 (02 Apr 2021 11:00) (66 - 100)  BP: 111/66 (02 Apr 2021 10:00) (102/62 - 137/81)  BP(mean): 80 (02 Apr 2021 10:00) (71 - 87)  RR: 18 (02 Apr 2021 11:00) (16 - 25)  SpO2: 100% (02 Apr 2021 11:00) (96% - 100%) ICU Vital Signs Last 24 Hrs  T(C): 37 (02 Apr 2021 11:00), Max: 37.3 (02 Apr 2021 03:35)  T(F): 98.6 (02 Apr 2021 11:00), Max: 99.1 (02 Apr 2021 03:35)  HR: 66 (02 Apr 2021 13:00) (66 - 100)  BP: 109/62 (02 Apr 2021 13:00) (102/62 - 137/81)  BP(mean): 77 (02 Apr 2021 13:00) (71 - 87)  ABP: --  ABP(mean): --  RR: 18 (02 Apr 2021 13:00) (16 - 25)  SpO2: 100% (02 Apr 2021 13:00) (96% - 100%)
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
## 2021-04-02 NOTE — H&P ADULT - NSHPPHYSICALEXAM_GEN_ALL_CORE
aaox3, eomi, fc, no facial, sensation on face intact, UE 0/5, LE 0/5, no sensation from level of deltoids down. Has some pain sensation on shoulders. hyporeflexic

## 2021-04-02 NOTE — BEHAVIORAL HEALTH ASSESSMENT NOTE - NSBHCHARTREVIEWIMAGING_PSY_A_CORE FT
CT Brain + Cervical Spine, CTA Neck + Brain:  IMPRESSION:  CT BRAIN: No acute intracranial bleeding.  CT CERVICAL SPINE: No fracture.  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  CTA BRAIN: Patent intracranial circulation. No flow-limiting stenosis or occlusion.

## 2021-04-02 NOTE — CONSULT NOTE ADULT - ASSESSMENT
55F w/ pmh Multiple sclerosis now quadriplegic post trauma, CT cervical spine showed no fracture,  CTA showed right vertebral artery dissection of the distal V2 and V3 segments reconstituted flow within the V4 intradural segment, in keeping with traction injury to the neck. 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    55F w/ pmh Multiple sclerosis now quadriplegic post trauma, CT cervical spine showed no fracture,CTA showed right vertebral artery dissection of the distal V2 and V3 segments reconstituted flow within the V4 intradural segment, in keeping with traction injury to the neck. 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      Plan:  - aspirin for vertebral artery dissection only if cleared by neurosurgery given C3-C4 cord lesion with possible hemorrhagic conversion  - neurology will follow

## 2021-04-02 NOTE — CHART NOTE - NSCHARTNOTEFT_GEN_A_CORE
CAPRINI SCORE [CLOT] Score on Admission for     AGE RELATED RISK FACTORS                                                       MOBILITY RELATED FACTORS  [x ] Age 41-60 years                                            (1 Point)                [x ] Bed rest                                                        (1 Point)  [ ] Age: 61-74 years                                           (2 Points)                 [ ] Plaster cast                                                   (2 Points)  [ ] Age= 75 years                                              (3 Points)                 [ ] Bed bound for more than 72 hours                 (2 Points)    DISEASE RELATED RISK FACTORS                                               GENDER SPECIFIC FACTORS  [ ] Edema in the lower extremities                       (1 Point)                  [ ] Pregnancy                                                     (1 Point)  [ ] Varicose veins                                               (1 Point)                  [ ] Post-partum < 6 weeks                                   (1 Point)             [ ] BMI > 25 Kg/m2                                            (1 Point)                  [ ] Hormonal therapy  or oral contraception          (1 Point)                 [ ] Sepsis (in the previous month)                        (1 Point)                  [ ] History of pregnancy complications                 (1 point)  [ ] Pneumonia or serious lung disease                                               [ ] Unexplained or recurrent                     (1 Point)           (in the previous month)                               (1 Point)  [ ] Abnormal pulmonary function test                     (1 Point)                 SURGERY RELATED RISK FACTORS (include planned surgeries)  [ ] Acute myocardial infarction                              (1 Point)                 [ ]  Section                                             (1 Point)  [ ] Congestive heart failure (in the previous month)  (1 Point)              [ ] Minor surgery                                                  (1 Point)   [ ] Inflammatory bowel disease                             (1 Point)                 [ ] Arthroscopic surgery                                        (2 Points)  [ ] Central venous access                                      (2 Points)                [ ] General surgery lasting more than 45 minutes   (2 Points)       [ ] Stroke (in the previous month)                          (5 Points)               [ ] Elective arthroplasty                                         (5 Points)            [ ] current or past malignancy                              (2 Points)                                                                                                       HEMATOLOGY RELATED FACTORS                                                 TRAUMA RELATED RISK FACTORS  [ ] Prior episodes of VTE                                     (3 Points)                [ ] Fracture of the hip, pelvis, or leg                       (5 Points)  [ ] Positive family history for VTE                         (3 Points)                 [ ] Acute spinal cord injury (in the previous month)  (5 Points)  [ ] Prothrombin 72246 A                                     (3 Points)                 [x ] Paralysis  (less than 1 month)                             (5 Points)  [ ] Factor V Leiden                                             (3 Points)                  [ ] Multiple Trauma within 1 month                        (5 Points)  [ ] Lupus anticoagulants                                     (3 Points)                                                           [ ] Anticardiolipin antibodies                               (3 Points)                                                       [ ] High homocysteine in the blood                      (3 Points)                                             [ ] Other congenital or acquired thrombophilia      (3 Points)                                                [ ] Heparin induced thrombocytopenia                  (3 Points)                                          Total Score [       7   ]    Risk:  Very low 0   Low 1 to 2   Moderate 3 to 4   High =5       VTE Prophylasix Recommednations:  [ x] mechanical pneumatic compression devices                                      [ ] contraindicated: _____________________  [ ] chemo prophylasix                                                                                   [ x] contraindicated __pending imaging to r/o heme___________________    **** HIGH LIKELIHOOD DVT PRESENT ON ADMISSION  [x ] (please order LE dopplers within 24 hours of admission)

## 2021-04-02 NOTE — OCCUPATIONAL THERAPY INITIAL EVALUATION ADULT - ADDITIONAL COMMENTS
CT BRAIN 4/1: No acute intracranial bleeding. CT CERVICAL SPINE: No fracture. 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 CTA BRAIN: Patent intracranial circulation. No flow-limiting stenosis or occlusion. CT BRAIN 4/1: No acute intracranial bleeding. CT CERVICAL SPINE: No fracture. 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 CTA BRAIN: Patent intracranial circulation. No flow-limiting stenosis or occlusion.  B/L LE doppler 4/2-Bilateral below the knee soleal vein deep venous thrombosis.

## 2021-04-02 NOTE — SWALLOW BEDSIDE ASSESSMENT ADULT - SWALLOW EVAL: RECOMMENDED DIET
Dysphagia I with nectar thick liquids. Pt is cleared for Ensure and Boost liquids (does not need to be thickened).
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
## 2021-04-02 NOTE — PHYSICAL THERAPY INITIAL EVALUATION ADULT - PLANNED THERAPY INTERVENTIONS, PT EVAL
balance training/bed mobility training/strengthening/transfer training balance training/bed mobility training/transfer training

## 2021-04-02 NOTE — PHYSICAL THERAPY INITIAL EVALUATION ADULT - ADDITIONAL COMMENTS
CT BRAIN 4/1/21: No acute intracranial bleeding. CT CERVICAL SPINE: No fracture.  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  CTA BRAIN 4/1/21: Patent intracranial circulation. No flow-limiting stenosis or occlusion. CT BRAIN 4/1/21: No acute intracranial bleeding. CT CERVICAL SPINE: No fracture.  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  CTA BRAIN 4/1/21: Patent intracranial circulation. No flow-limiting stenosis or occlusion. pt was independent in adl's and fxl activity CT BRAIN 4/1/21: No acute intracranial bleeding. CT CERVICAL SPINE: No fracture.  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  CTA BRAIN 4/1/21: Patent intracranial circulation. No flow-limiting stenosis or occlusion. pt was independent in adl's and fxl activity pt does own a rolling walker and straight cane but did not need to use ; pt lives in house with spouse and child 4 steps to enter with no HR then FOS w/ HR to bedroom level , stall shower +

## 2021-04-02 NOTE — ED ADULT NURSE REASSESSMENT NOTE - NS ED NURSE REASSESS COMMENT FT1
Pt states that she would like to refuse the Phenylephrine. RN explained to pt that it is very important for her care and explained the benefits. Pt refused. MD Lucas and Jan notified. MD Rhodes at bedside to explain why Phenylephrine is used in pts with spinal cord injuries. Pt would like to wait until after MRI to see if it is absolutely necessary. MD Rhodes states to hold Phenylephrine for now. ([id number])Pt requesting to have HOB elevated. MD Rhodes raised HOB for pt. Pt in C-collar. On cardiac monitor, NSR. MRI forms completed with RN [place], given to reps to fax to MRI. Bladder scan performed, shows > 509mL In bladder. MD Montoya notified. Pt states that she would like to refuse the Phenylephrine. RN explained to pt that it is very important for her care and explained the benefits. Pt refused. MD Lucas and Jan notified. MD Rhodes at bedside to explain why Phenylephrine is used in pts with spinal cord injuries. Pt would like to wait until after MRI to see if it is absolutely necessary. MD Rhodes states to hold Phenylephrine for now. ([id number])Pt requesting to have HOB elevated. MD Rhodes raised HOB for pt. Pt in C-collar. On cardiac monitor, NSR. MRI forms completed with RN [place], given to reps to fax to MRI. Bladder scan performed, shows > 509mL In bladder. Neuro team at bedside attempting to place sanchez. Pt to go to MRI after Sanchez attempt with Neurosurg PA. RN [place] Break Coverage.

## 2021-04-02 NOTE — OCCUPATIONAL THERAPY INITIAL EVALUATION ADULT - ADL RETRAINING, OT EVAL
Pt will perform self-feeding with mod A with AE as needed, within 4 weeks. Pt will perform oral hygiene mod A with AE as needed within 4 weeks. Pt will performed UB dressing max A within 4 weeks. Pt will perform self-feeding with max A with AE as needed, within 4 weeks. Pt will perform oral hygiene max A with AE as needed within 4 weeks. Pt will performed UB dressing max A within 4 weeks.
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
## 2021-04-02 NOTE — PHYSICAL THERAPY INITIAL EVALUATION ADULT - BALANCE DISTURBANCE, IDENTIFIED IMPAIRMENT CONTRIBUTE, REHAB EVAL
presenting quadriplegic/impaired motor control/impaired postural control/decreased sensation/impaired sensory feedback/decreased strength

## 2021-04-02 NOTE — PROGRESS NOTE ADULT - SUBJECTIVE AND OBJECTIVE BOX
Radhika was awake and her  Tito was present as I discussed and showed her different cervical braces as she was very reluctant to change the EMT collar she currently had on. She finally decided to allow me to fit her with an adjustable [place] J brace which she was satisfied with. Additional liners and contact info were provided , and the EMT collar was placed in her belongings bag in case she wants to switch again.   [place] [phone]

## 2021-04-02 NOTE — OCCUPATIONAL THERAPY INITIAL EVALUATION ADULT - ASR WT BEARING STATUS EVAL
cervical collar at all times; MR cervical spine 4/2: Focal T2/STIR hyperintense signal in the C3-C4 cord with associated cord expansion and gradient susceptibility is in keeping with hemorrhagic cord contusion, consistent with hyperextension injury. Central cord syndrome is likely present. T2/STIR hyperintense signal along the C1-C4 spinous processes indicates interspinous ligamentous injury. Small prevertebral fluid measures up to 4 mm in thickness from C1 through C5. Loss of normal T2 flow void with associated T1 hyperintense signal in the mid to distal cervical right vertebral artery is in keeping with known vertebral artery dissection. C5-C6 disc degeneration with loss of disc height, uncovertebral joint spurring indents the ventral thecal sac and contacts the ventral surface of the cord with moderate central canal stenosis and bilateral high-grade bilateral foraminal stenosis.

## 2021-04-02 NOTE — BEHAVIORAL HEALTH ASSESSMENT NOTE - CASE SUMMARY
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  On interview today, pt. is generally calm and cooperative. She is able to answer questions though is unable to acknowledge the severity of her illness and seems to minimize the importance of potentially life-saving vasopressors out of concern for raising her blood pressure. Pt. is fixated on attempting to treat herself via fluid boluses before starting medications. She additionally appears to be perseverating about signing a DNR/DNI, stating that she does not want anything about her capacity to be documented because "it will affect my ability to sign the DNR/DNI" not able to recall that she had signed the DNR/DNI form prior to the interview. Pt. denies current SI/HI, perceptual disturbances including AH/VH, and delusions though her  expressed concerns that she had become increasingly paranoid over the past year.  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    Patient is able to sign for her DNR/DNI order and is able to designate a proxy (already has proxy papers designating her  as her proxy), but she lacks capacity to participate in medical and surgical decisions and in decisions regarding discharge planning.  Her  is aware and will be her proxy to sign consent for her treatment.

## 2021-04-02 NOTE — PROCEDURE NOTE - ADDITIONAL PROCEDURE DETAILS
Consulted by primary team for urinary retention of 500cc after spinal cord injury. Patient with hx of uterine prolapse; primary team unsuccessfully attempted guerrero placement prior to arrival. Using aseptic technique, 16F guerrero catheter was placed with ease on first attempt. Patient tolerated procedure well with (+) return of 200 cc of clear yellow urine.

## 2021-04-02 NOTE — SWALLOW BEDSIDE ASSESSMENT ADULT - COMMENTS
Physical Exam: aaox3, eomi, fc, no facial, sensation on face intact, UE 0/5, LE 0/5, no sensation from level of deltoids down. Has some pain sensation on shoulders. hyporeflexic  4/1: CT BRAIN: No acute intracranial bleeding.    CT CERVICAL SPINE: No fracture.    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    CTA BRAIN: Patent intracranial circulation. No flow-limiting stenosis or occlusion.

## 2021-04-02 NOTE — OCCUPATIONAL THERAPY INITIAL EVALUATION ADULT - LEVEL OF INDEPENDENCE: SUPINE/SIT, REHAB EVAL
Last given #40 with 0 refills on 2/24/20.    Last OV 10/7/19.    Due for recheck 10/7/20.   Does not have appt scheduled.     Meds on med tab pending your approval.  Set to send to pharmacy via e-prescribe if approved.    PDMP review: Criteria met. Prescription forwarded to MD for signature.         TBA mechanical lift device/dependent (less than 25% patients effort)

## 2021-04-02 NOTE — PHYSICAL THERAPY INITIAL EVALUATION ADULT - IMPAIRMENTS CONTRIBUTING IMPAIRED BED MOBILITY, REHAB EVAL
impaired motor control/abnormal muscle tone/impaired postural control/decreased sensation/impaired sensory feedback/decreased strength sat in federica pad on bed with feet support on floor x 20 min OT support back max and PT in front max , work in sitting on forward back motion of body ,holding head up with assist , denies dizziness or other sx except pain 6/10 back of neck when upright had tylenol 1 hr 15 min ago per selina Gee pt decines oxy as family coming to visit and wants to be awake/impaired motor control/abnormal muscle tone/impaired postural control/decreased sensation/impaired sensory feedback/decreased strength

## 2021-04-02 NOTE — CONSULT NOTE ADULT - PROBLEM SELECTOR RECOMMENDATION 4
Met with pt at bedside.  Pt able to describe events that lead up to injury and to describe hospital course thus far.  Pt states that she signed a MOLST form confirming her code status of DNR/DNI.  HCP in chart as well naming her  Tito as primary HCP and her sister as alternate HCP.  Questions answered emotional support provided.  We reviewed her request for fluids prior to pressors and her rational behind her request. See psych consult for capacity assessment.  Requested Ethics involvement at this point to assist in decision making process. Palliative care will remain available at teams request.

## 2021-04-02 NOTE — BEHAVIORAL HEALTH ASSESSMENT NOTE - RISK ASSESSMENT
Low Acute Suicide Risk Pt. is not at an acute risk of suicide or violence. She has no reported SI/HI, no PPHx,

## 2021-04-02 NOTE — PHYSICAL THERAPY INITIAL EVALUATION ADULT - GROSSLY INTACT, SENSORY
impaired feels numbess tingling ue's and le's per pt ; pt able to feel light touch ue's , proprioception absent ue's and le's , pain sensation can feel mild impairment , Deep pressure mod impairment neck down impaired feels numbess tingling ue's and le's per pt ; pt able to feel light touch ue's , proprioception absent ue's and le's , pain sensation can feel mild impairment , Deep pressure mod impairment neck down, pt feels L le moving w/ ROm

## 2021-04-02 NOTE — BEHAVIORAL HEALTH ASSESSMENT NOTE - NSBHCHARTREVIEWLAB_PSY_A_CORE FT
13.6   13.52 )-----------( 275      ( 01 Apr 2021 20:41 )             42.1   04-01    140  |  104  |  25<H>  ----------------------------<  106<H>  4.5   |  25  |  0.77    Ca    9.2      01 Apr 2021 20:41    TPro  6.7  /  Alb  4.1  /  TBili  0.5  /  DBili  x   /  AST  43<H>  /  ALT  27  /  AlkPhos  59  04-01

## 2021-04-02 NOTE — PHYSICAL THERAPY INITIAL EVALUATION ADULT - GENERAL OBSERVATIONS, REHAB EVAL
pt received in bed nad + cervical collar on all siderails up + primafit to wall suction ; pt HOb 30 degrees now 40 degrees as pt wish to see better in upright position , md's in during session x 2 ; pt perform bed eval with PT

## 2021-04-02 NOTE — SWALLOW BEDSIDE ASSESSMENT ADULT - SLP GENERAL OBSERVATIONS
Pt awake in bed, c-collar in place, cleared for HOB elevation. Pt able to communicate needs and follow directions for exam. Reduced vocal volume. Flat affect. 3L O2 via NC. Absent movement of B/L UE or B/L LE. Spouse present at bedside.

## 2021-04-02 NOTE — BEHAVIORAL HEALTH ASSESSMENT NOTE - NSBHSOCIALHXDETAILSFT_PSY_A_CORE
Pt. is  with two children, 17yo son and 21yo daughter. 17yo son has mental disability and 21yo daughter is currently in college in [place]. Pt. 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 Pt. has strong social support system. Pt. is  with two children, 17yo son and 23yo daughter. 17yo son has a mental disability and 23yo daughter is currently in college in [place]. Pt. 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  is a dentist and works part-time. Pt. has a strong social support system.

## 2021-04-02 NOTE — OCCUPATIONAL THERAPY INITIAL EVALUATION ADULT - IMPAIRMENTS CONTRIBUTING IMPAIRED BED MOBILITY, REHAB EVAL
impaired balance/impaired coordination/abnormal muscle tone/decreased ROM/decreased sensation/impaired sensory feedback/decreased strength

## 2021-04-02 NOTE — OCCUPATIONAL THERAPY INITIAL EVALUATION ADULT - MUSCLE TONE ASSESSMENT, REHAB EVAL
bilateral lower extremities/mildly increased tone +clonus LLE/bilateral upper extremities/bilateral lower extremities/mildly decreased tone

## 2021-04-02 NOTE — CHART NOTE - NSCHARTNOTEFT_GEN_A_CORE
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 performed.    Through the brain, sagittal and axial T1, axial T2, FLAIR, diffusion weighted images and an ADC map were obtained. Axial T1 post-contrast images were obtained and reconstructed in sagittal and coronal planes, 6 cc of intravenous gadolinium was administered and 1.5 ccdiscarded,  for contrast MRI brain and contrast enhanced MR angiography of the extracranial circulation.    MR angiography of intracranial and extracranial circulation was performed with time of flight imaging technique. Maximal intensity projection images were reviewed in multiple planes.      MRI cervical spine: Sagittal T1, sagittal T2, sagittal inversion recovery, axial 3D FIESTA, and axial T1 images are obtained of the cervical spine without contrast.      COMPARISON: Head CT, CT angiogram and neck, CT cervical spine, 4/1/2021    FINDINGS:  Brain MRI:  Mild to moderate enlargement of ventricles and sulci consistent with volume loss. There is slight restricted diffusion associated with hyperintense DWI signal along the bilateral medulla (4:8, 400:8), without significant alteration in T2 and FLAIR signal, tiny developing region of ischemia not excluded. There is a stimulator susceptibility along the upper cervical spinal cord and medulla likely hemosiderin staining (5:6). There are foci of T2/FLAIR signal hyperintensity within the hemispheric white matter, which are nonspecific but likely related to sequelae of microvascular disease. There are additional foci that involve the corpus callosum and radiate away from the ventricles in a perpendicular fashion and may represent sequela of demyelinating disease. There is no obvious enhancement of any of these lesions to suggest any active inflammatory change. There is no intraparenchymal hematoma, mass effect or midline shift. No abnormal extra-axial fluid collections are present. Pituitary is unremarkable in size The basal cisterns are patent.    The calvarium is intact. There is a left maxillary retention cyst and/or polyp, there is mild mucosal thickening in the ethmoid, maxillary, frontal, and sphenoid sinuses. Orbits and tympanomastoid cavities appear free of acute disease.      Brain MRA:  Absent and decreased flow signal in proximal right intradural right V4 segment (3:159-140) with reconstitution of the distal intradural segment 4 8 mm proximal to the vertebrobasilar junction likely via collateral flow from the left vertebral artery and vertebrobasilar confluence. Dominant, tortuous patent left V4 segment   crosses to the right of midline flattening   left medullary pyramid with slight fusiform decreased caliber  to the vertebrobasilar junction. Basilar artery is patent and tortuous  at proximal portion with decreased flow related signal and caliber  distal basilar segment. Fetal origins bilateral posterior cerebral arteries with hypoplastic P1 segments, left side larger than right likely developmental anatomic variation. The proximal posterior cerebral arteries are patent, there are  stenosis along the distal PCA branch vessels.    Bilateral internal carotid arteries are patent and  tortuous in cavernous, supraclinoid, and bifurcation segments. Proximal anterior and middle cerebral arteries  and anterior communicating artery are patent, no flow-limiting stenosis. Trifurcation of vessels anterior communicating artery likely developmental  variation. Tiny less than 1 mm outpouchings bilateral supraclinoid internal carotid arteries (3:97), likely infundibula at anterior choroidal artery origins.      Neck MRA:  The aortic arch and great vessel originsare patent and unremarkable.    Vertebral arteries:  Redemonstration absent flow-related signal in cervical right vertebral artery 10 cm from vessel origin concerning for acute arterial dissection and/or thrombosis with near complete absence of flow related signal along the right vertebral artery in distal right V2 and V3 segments (500:8). Irregularity of the vessel caliber along the right vertebral artery most pronounced at the right C3-4 level, and C5-6 levels, pseudoaneurysm not excluded. (Cervical spine neck MRI 6:16, 6:22).    Dominant anterior circulation with smaller posterior circulation and  patent left vertebral artery in left V1, V2, V3, and V4 segments with tapered narrowed left V4 segment proximal to  vertebrobasilar confluence. Reconstitution  distal intradural right V4 segment via left vertebral artery and vertebrobasilar confluence.    Common and Internal Carotids: The origin and course and caliber of the common and internal carotid arteries is unremarkable.  There is no hemodynamically significant stenosis by NASCET criteria at origins of bilateral internal or external carotid arteries.      MRI cervical spine:    Alignment is anatomic with mild rotary dextrocurvature of the cervical spine. There prevertebral soft tissue swelling, with decreased T1, and hyperintense T2 and STIR signal from craniocervical junction to the C5 level, concerning for edema and or hemorrhage with disruption of the anterior longitudinal ligament at the C2 level (3:8), correlate with any hyperextension injury. There is slight irregularity of the posterior longitudinal ligament at the C6 level (3:9) without definitive disruption. There is slight heterogeneity of marrow signal intensity on the short TR scans consistent with osteopenia. No acute edema pattern is seen.    Abnormal 0.57 x 0.95 x 1.3 cm, AP, TR, CC (6:14, 3:8 5:8),  foci  decreased T1 and hyperintense T2 and STIR signal in slightly expanded intramedullary cervical spinal cord at C3-4, differential includes new ischemia, small foci of demyelination also not excluded. On the axial GRE images, this foci demonstrate decreased GRE signal (7:15), concerning for possible petechial hemorrhage.    Multilevel degenerative osteoarthritis  is present. Findings include marginal osteophytes anteriorly, uncovertebral spurring, and facet joint hypertrophic osteophytes. There is multilevel degenerative disc disease. Findings include loss of normal disc space signal on the long TR scans and loss of disc space height.    At the C2-C3 level, central midline disc herniation without canal or foraminal narrowing.    At the C3-C4 level, disc osteophyte ridge with right paramedian and lateral extension (6:15), AP canal is 9.3 mm, with mild bilateral foraminal narrowing.    At the C4-C5 level, small central midline disc deformity, no significant canal or foraminal narrowing    At the C5-C6 level, disc osteophyte ridge with central and right paramedian disc herniation flattens ventral thecal sac and cord, canal 18 measures 7.2 mm, uncovertebral and facet hypertrophy contribute to mild-to-moderate inferior bilateral foraminal narrowing.    At the C6-C7 level, disc osteophyte ridge with paramedian disc deformities, left and right uncovertebral and facet hypertrophy cause mild left foraminal narrowing without significant central canal or right foraminal narrowing    At the C7-T1 level, no significant canal or foraminal narrowing. Incidental note is made of a prominent right-sided perineural cyst (6:27).    There is biapical parenchymal scarring.    IMPRESSION:    Redemonstration absent flow signal distal right V2 and right V3 vertebral artery segments of vessel irregularity at C2-3 and C3-5-6 levels, pseudoaneurysms not excluded, concerning for thrombosis and/or dissection. There is decreased flow signal of the right vertebral artery at the proximal right V4 segment with reconstitution of distal right V4 segment via left vertebral artery and vertebrobasilar confluence.    Abnormal 0.57 x 0.95 x 1.3 cm, AP, TR, CC (6:14, 3:8 5:8),  decreased T1 and hyperintense T2 and STIR signal with susceptibility on axial GRE (7:16), within the intramedullary cervical spinal cord at the C3-4 level concerning for acute ischemic change with petechial hemorrhagic transformation, other etiologies including atypical demyelination with hemorrhage not excluded, although considered less likely.    Restricted diffusion with DWI hyperintensity along the bilateral medulla near the olives (4:8, 400:8), without significant T2 or corresponding FLAIR hyperintensity, with susceptibility along the upper cervicomedullary junction and cord on SWI sequences, small foci of developing ischemia not excluded.    Intracranial volume loss, nonspecific T2 and FLAIR matter hyperintensity likely sequela of microvascular disease,  additional foci radiating away from ventricles in a perpendicular fashion with a central vein sign, involving corpus callosum likely foci of of demyelination, no intracranial hemorrhage or midline shift.    Decreased T1,hyperintense T2 and STIR signal in prevertebral soft tissues concerning for edema and/or hemorrhage disruption anterior longitudinal ligament at C2 level (3:8).    Multilevel degenerative changes  in the cervical spine as detailed above.    Findings discussed with  TONY Madera in the NICU at immediate time of review on 4/2/21 at 9:52 am.          List Injuries Identified to Date:    Acute and Incidental Radiology Findings:  -absent flow signal distal right V2 and right V3 vertebral artery segments of vessel irregularity at C2-3 and C3-5-6 levels, pseudoaneurysms not excluded, concerning for thrombosis and/or dissection  - Abnormal 0.57 x 0.95 x 1.3 cm, AP, TR, CC (6:14, 3:8 5:8),  decreased T1 and hyperintense T2 and STIR signal with susceptibility on axial GRE (7:16), within the intramedullary cervical spinal cord at the C3-4 level concerning for acute ischemic change with petechial hemorrhagic transformatio  -Decreased T1,hyperintense T2 and STIR signal in prevertebral soft tissues concerning for edema and/or hemorrhage disruption anterior longitudinal ligament at C2 level (3:8).  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    List Operative and Interventional Radiological Procedures: ***    Consults (Date):  [x] Neurosurgery   [] Orthopedic Surgery  [] Spine Surgery  [] Plastic Surgery  [] ENT  [] Urology  [] PM&R  [] Social Work    INTERPRETATION/ASSESSMENT:   JOSE LUIS RAM is a 55y Female who required a tertiary survey due to trauma. No incidental findings found on radiology. 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 Follow up management of findings on MRI/MRA Head and Neck.    PLAN:   - Care per primary team  - Please page trauma with any additional concerns    Glen Zavaleta  PGY-1  ATP  [id number]
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
## 2021-04-03 ENCOUNTER — TRANSCRIPTION ENCOUNTER (OUTPATIENT)
Age: 56
End: 2021-04-03

## 2021-04-03 DIAGNOSIS — N81.4 UTEROVAGINAL PROLAPSE, UNSPECIFIED: ICD-10-CM

## 2021-04-03 DIAGNOSIS — Z71.89 OTHER SPECIFIED COUNSELING: ICD-10-CM

## 2021-04-03 DIAGNOSIS — T14.90XA INJURY, UNSPECIFIED, INITIAL ENCOUNTER: ICD-10-CM

## 2021-04-03 DIAGNOSIS — Z29.9 ENCOUNTER FOR PROPHYLACTIC MEASURES, UNSPECIFIED: ICD-10-CM

## 2021-04-03 DIAGNOSIS — G35 MULTIPLE SCLEROSIS: ICD-10-CM

## 2021-04-03 DIAGNOSIS — R06.03 ACUTE RESPIRATORY DISTRESS: ICD-10-CM

## 2021-04-03 DIAGNOSIS — K59.00 CONSTIPATION, UNSPECIFIED: ICD-10-CM

## 2021-04-03 DIAGNOSIS — I82.409 ACUTE EMBOLISM AND THROMBOSIS OF UNSPECIFIED DEEP VEINS OF UNSPECIFIED LOWER EXTREMITY: ICD-10-CM

## 2021-04-03 PROCEDURE — 99231 SBSQ HOSP IP/OBS SF/LOW 25: CPT | Mod: 25

## 2021-04-03 PROCEDURE — 99232 SBSQ HOSP IP/OBS MODERATE 35: CPT

## 2021-04-03 PROCEDURE — 99223 1ST HOSP IP/OBS HIGH 75: CPT

## 2021-04-03 PROCEDURE — 51700 IRRIGATION OF BLADDER: CPT

## 2021-04-03 PROCEDURE — 90792 PSYCH DIAG EVAL W/MED SRVCS: CPT

## 2021-04-03 PROCEDURE — 99233 SBSQ HOSP IP/OBS HIGH 50: CPT | Mod: GC

## 2021-04-03 PROCEDURE — 99291 CRITICAL CARE FIRST HOUR: CPT

## 2021-04-03 RX ORDER — DIAZEPAM 5 MG
2.5 TABLET ORAL ONCE
Refills: 0 | Status: DISCONTINUED | OUTPATIENT
Start: 2021-04-03 | End: 2021-04-03

## 2021-04-03 RX ORDER — POTASSIUM PHOSPHATE, MONOBASIC POTASSIUM PHOSPHATE, DIBASIC 236; 224 MG/ML; MG/ML
30 INJECTION, SOLUTION INTRAVENOUS ONCE
Refills: 0 | Status: COMPLETED | OUTPATIENT
Start: 2021-04-03 | End: 2021-04-03

## 2021-04-03 RX ORDER — ATROPA BELLADONNA AND OPIUM 16.2; 6 MG/1; MG/1
1 SUPPOSITORY RECTAL DAILY
Refills: 0 | Status: DISCONTINUED | OUTPATIENT
Start: 2021-04-03 | End: 2021-04-03

## 2021-04-03 RX ORDER — SODIUM CHLORIDE 9 MG/ML
1000 INJECTION, SOLUTION INTRAVENOUS
Refills: 0 | Status: DISCONTINUED | OUTPATIENT
Start: 2021-04-03 | End: 2021-04-05

## 2021-04-03 RX ORDER — ENOXAPARIN SODIUM 100 MG/ML
40 INJECTION SUBCUTANEOUS
Refills: 0 | Status: DISCONTINUED | OUTPATIENT
Start: 2021-04-03 | End: 2021-04-06

## 2021-04-03 RX ORDER — CALCIUM GLUCONATE 100 MG/ML
2 VIAL (ML) INTRAVENOUS ONCE
Refills: 0 | Status: COMPLETED | OUTPATIENT
Start: 2021-04-03 | End: 2021-04-03

## 2021-04-03 RX ORDER — ASPIRIN/CALCIUM CARB/MAGNESIUM 324 MG
81 TABLET ORAL DAILY
Refills: 0 | Status: DISCONTINUED | OUTPATIENT
Start: 2021-04-03 | End: 2021-04-12

## 2021-04-03 RX ADMIN — SODIUM CHLORIDE 75 MILLILITER(S): 9 INJECTION, SOLUTION INTRAVENOUS at 18:34

## 2021-04-03 RX ADMIN — HEPARIN SODIUM 5000 UNIT(S): 5000 INJECTION INTRAVENOUS; SUBCUTANEOUS at 05:47

## 2021-04-03 RX ADMIN — Medication 200 GRAM(S): at 03:12

## 2021-04-03 RX ADMIN — Medication 81 MILLIGRAM(S): at 12:46

## 2021-04-03 RX ADMIN — Medication 2.5 MILLIGRAM(S): at 22:29

## 2021-04-03 RX ADMIN — ENOXAPARIN SODIUM 40 MILLIGRAM(S): 100 INJECTION SUBCUTANEOUS at 18:58

## 2021-04-03 RX ADMIN — SODIUM CHLORIDE 75 MILLILITER(S): 9 INJECTION INTRAMUSCULAR; INTRAVENOUS; SUBCUTANEOUS at 03:12

## 2021-04-03 RX ADMIN — Medication 5 MILLIGRAM(S): at 05:47

## 2021-04-03 RX ADMIN — POTASSIUM PHOSPHATE, MONOBASIC POTASSIUM PHOSPHATE, DIBASIC 83.33 MILLIMOLE(S): 236; 224 INJECTION, SOLUTION INTRAVENOUS at 03:12

## 2021-04-03 NOTE — DISCHARGE NOTE PROVIDER - DETAILS OF MALNUTRITION DIAGNOSIS/DIAGNOSES
This patient has been assessed with a concern for Malnutrition and was treated during this hospitalization for the following Nutrition diagnosis/diagnoses:     -  04/07/2021: Severe protein-calorie malnutrition   -  04/07/2021: Underweight (BMI < 19)

## 2021-04-03 NOTE — DISCHARGE NOTE PROVIDER - CARE PROVIDER_API CALL
Yusuf Menard (DO)  Cardiovascular Disease; Internal Medicine; Nuclear Cardiology  [address] [place] [address]  Phone: [phone]  Fax: [phone]  Follow Up Time:     Ananya Heard)  PhysicalRehab Medicine  [address] [facility]  [place], [place] [id number]  Phone: [phone]  Fax: [phone]  Follow Up Time:     Christine Fernandez)  Neurology  [address] [place] [address]  Phone: [phone]  Fax: [phone]  Follow Up Time:     Libman, Richard B (MD)  Neurology; Vascular Neurology  [address] [facility], [address]  [place] [address]  Phone: [phone]  Fax: [phone]  Follow Up Time:
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
## 2021-04-03 NOTE — PROGRESS NOTE ADULT - ASSESSMENT
A/P:  55F w/ pmh Multiple sclerosis now quadriplegic post trauma, CT cevrical spine no fracture  CTA 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  r/o thoracic epidural subdural hematoma  r/o cord contusion     Neuro: neuro checks q 1 hr  Emergent MRI brain, C-T spine   target MAP> 85 mmhg - however patient refusing IV pressors   consider decadron depending on the MRI results  has MS but no residual weakness and is not on any medication   psych c/s for   PT/OT    Respiratory: RA, monitor respiratory status given high spine injury  subjective SOB- however saturating well     CV: MAP goal > 85 mmhg  needs a line - however refusing       Endocrine: target euglycemia      Heme/Onc:               DVT ppx: SCD hold chemoprophylaxis until MRI results, anticipate heparin sq      Renal: NS 75 ml/hr  urinary retention, guerrero to be placed by urology given uterine prolapse   c/s GYN    ID: afebrile     GI: NPO for now speech & swallow c/s   senna, miralax   ensure BM     Social/Family:  updated     Discharge planning:   Code Status: [x] Full Code [] DNR [] DNI [] Goals of Care: palliative   Disposition: [x] ICU [] Stroke Unit [] RCU []PCU []Floor [] Discharge Home     Patient at high risk for neurologic deterioration respiratory failure, DVT, PE    critical care time, excluding procedures: 45 minutes                      A/P:  55F w/ pmh Multiple sclerosis now quadriplegic post trauma, CT cevrical spine no fracture  CTA 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  r/o thoracic epidural subdural hematoma  r/o cord contusion     Neuro: neuro checks q 4 hr  has MS but no residual weakness and is not on any medication - neurology follow for MS   okay to start ASA 81mg QD  psych c/s'd okay to sign DNR/I however unable to consent for medical decision making  PT/OT- following     Respiratory: RA, monitor respiratory status given high spine injury    CV:   MAP >65  midodrine d/c'd    Endocrine: target euglycemia      Heme/Onc:               DVT ppx:   SCD, lovenox QD  b/l DVT below knee  -vascular cardio called for possible IVC filter     Renal: NS 75 ml/hr  urinary retention, guerrero to be placed by urology given uterine prolapse   GYN consulted - following for prolapse    ID: afebrile     GI: dysphagia 1 diet  senna, miralax   ensure BM     Social/Family:  updated     Discharge planning:   Code Status: [x] Full Code [] DNR [] DNI [x] Goals of Care: palliative consulted, ethics following   Disposition: [] ICU [] Stroke Unit [] RCU []PCU [x]Floor (MEDICINE-accepted by Dr. Yannick Dixon) [] Discharge Home     Patient at high risk for neurologic deterioration respiratory failure, DVT, PE    critical care time, excluding procedures: 45 minutes

## 2021-04-03 NOTE — DISCHARGE NOTE PROVIDER - PROVIDER TOKENS
PROVIDER:[TOKEN:[[id number]]],PROVIDER:[TOKEN:[[id number]]],PROVIDER:[TOKEN:[[id number]]],PROVIDER:[TOKEN:[[id number]]]

## 2021-04-03 NOTE — DISCHARGE NOTE PROVIDER - NSDCCPCAREPLAN_GEN_ALL_CORE_FT
PRINCIPAL DISCHARGE DIAGNOSIS  Diagnosis: Vertebral artery dissection  Assessment and Plan of Treatment: You presented after trauma to your neck. You underwent MRI of the neck and cervical spine and was found to have vertebral artery dissection. Due to the severity of the dissection, you could not move your upper and lower extremities. At this time, the prognosis and recovery of the quadriplegia is unclear. Physical medicine and rehabilitation doctors saw you at the hospital. They recommended acute spinal cord injury facility for rehabilitation. Please follow up with outpatient neurology and physical medicine and rehabilitation doctors to continue to monitor progression of your illness. Please monitor for any pain or discomfort as smallest pain or discomfort can cause tremendous sympathetic drive and cause you to have headache, diaphoresis, and/or hypertension that could result in death. Please continue to look out for bladder distension, constipation, and/or pressure sores.      SECONDARY DISCHARGE DIAGNOSES  Diagnosis: DVT (deep venous thrombosis)  Assessment and Plan of Treatment: Due to your immobility, you underwent ultrasound of your bilateral legs to look for any blood clot formation. You were found to have bilateral soleal deep vein thrombosis. Vascular cardiology team saw you during this hospitalization and recommended routine monitoring of the legs every 2-3 weeks with vascular ultrasound. Please follow up with your vascular surgery physician to continue monitor the deep vein thrombosis in your calves.     PRINCIPAL DISCHARGE DIAGNOSIS  Diagnosis: Vertebral artery dissection  Assessment and Plan of Treatment: You presented after trauma to your neck. You underwent MRI of the neck and cervical spine and was found to have vertebral artery dissection. Due to the severity of the dissection, you could not move your upper and lower extremities. At this time, the prognosis and recovery of the quadriplegia is unclear. Physical medicine and rehabilitation doctors saw you at the hospital. They recommended acute spinal cord injury facility for rehabilitation. Please follow up with outpatient neurology and physical medicine and rehabilitation doctors to continue to monitor progression of your illness. Please follow up with outpatient neurosurgery doctor to repeat MRI of the head and neck within 6 weeks of discharge. Please monitor for any pain or discomfort as smallest pain or discomfort can cause tremendous sympathetic drive and cause you to have headache, diaphoresis, and/or hypertension that could result in death. Please continue to look out for bladder distension, constipation, and/or pressure sores.      SECONDARY DISCHARGE DIAGNOSES  Diagnosis: DVT (deep venous thrombosis)  Assessment and Plan of Treatment: Due to your immobility, you underwent ultrasound of your bilateral legs to look for any blood clot formation. You were found to have bilateral soleal deep vein thrombosis. Vascular cardiology team saw you during this hospitalization and recommended routine monitoring of the legs every 2-3 weeks with vascular ultrasound. Please follow up with your vascular surgery physician to continue monitor the deep vein thrombosis in your calves.    Diagnosis: Constipation  Assessment and Plan of Treatment: Please continue to use daily senna and miralax. You will have dulcolax suppository as needed. Please monitor your bowel movements.    Diagnosis: Respiratory difficulty  Assessment and Plan of Treatment: At this time, the cause for your poor inspiratory effort is likely due to deconditioning. Neurology saw you in the hospital and noted that this is not likely due to your multiple sclerosis. Please continue to use incentive spirometry to continue to exercise and open up your airways.

## 2021-04-03 NOTE — CHART NOTE - NSCHARTNOTEFT_GEN_A_CORE
General Neurology team reviewed case and assessed patient. History of MS. Follows with Dr. Madrid (Neurologist, [facility]). She was told by Dr. Madrid she is JUDY positive. She has been off DMT for at least a year due to concerns regarding being immunocompromised during the COVID pandemic.    RADIOLOGY:  -04/02 MRI Brain, MRA H/N, and MRI C-Spine:  Redemonstration absent flow signal distal right V2 and right V3 vertebral artery segments of vessel irregularity at C2-3 and C3-5-6 levels, pseudoaneurysms not excluded, concerning for thrombosis and/or dissection. There is decreased flow signal of the right vertebral artery at the proximal right V4 segment with reconstitution of distal right V4 segment via left vertebral artery and vertebrobasilar confluence.    Abnormal 0.57 x 0.95 x 1.3 cm, AP, TR, CC (6:14, 3:8 5:8),  decreased T1 and hyperintense T2 and STIR signal with susceptibility on axial GRE (7:16), within the intramedullary cervical spinal cord at the C3-4 level concerning for acute ischemic change with petechial hemorrhagic transformation, other etiologies including atypical demyelination with hemorrhage not excluded, although considered less likely.    Restricted diffusion with DWI hyperintensity along the bilateral medulla near the olives (4:8, 400:8), without significant T2 or corresponding FLAIR hyperintensity, with susceptibility along the upper cervicomedullary junction and cord on SWI sequences, small foci of developing ischemia not excluded.    Intracranial volume loss, nonspecific T2 and FLAIR matter hyperintensity likely sequela of microvascular disease,  additional foci radiating away from ventricles in a perpendicular fashion with a central vein sign, involving corpus callosum likely foci of of demyelination, no intracranial hemorrhage or midline shift.    Decreased T1,hyperintense T2 and STIR signal in prevertebral soft tissues concerning for edema and/or hemorrhage disruption anterior longitudinal ligament at C2 level (3:8).    Multilevel degenerative changes in the cervical spine.    ASSESSMENT:  54 yo female with a PMHx of Multiple sclerosis now quadriplegic post trauma. CT cervical spine showed no fracture, CTA showed right vertebral artery dissection of the distal V2 and V3 segments reconstituted flow within the V4 intradural segment, in keeping with traction injury to the neck. MRA N again showed R V2/V3 segment absent flow signal concerning for dissection. MRI C-Spine shows T2 hyperintensity at C3-4 level concerning for acute ischemic change with petechial hemorrhagic transformation. 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 Low suspicion for active MS flair.    RECOMMENDATIONS:  [] Would not recommend starting any DMT at this time.  [] No role for steroids at this time.  [] ASA 81 MG PO daily once cleared by neurosurgery.  [] Rest of care per primary team.    Case discussed with neurology attending Dr. Arnett. General Neurology team reviewed case and assessed patient. History of MS. Follows with Dr. Madrid (Neurologist, [facility]). She was told by Dr. Madrid she is JUDY positive. She has been off DMT for at least a year due to concerns regarding being immunocompromised during the COVID pandemic.    RADIOLOGY:  -04/02 MRI Brain, MRA H/N, and MRI C-Spine:  Redemonstration absent flow signal distal right V2 and right V3 vertebral artery segments of vessel irregularity at C2-3 and C3-5-6 levels, pseudoaneurysms not excluded, concerning for thrombosis and/or dissection. There is decreased flow signal of the right vertebral artery at the proximal right V4 segment with reconstitution of distal right V4 segment via left vertebral artery and vertebrobasilar confluence.    Abnormal 0.57 x 0.95 x 1.3 cm, AP, TR, CC (6:14, 3:8 5:8),  decreased T1 and hyperintense T2 and STIR signal with susceptibility on axial GRE (7:16), within the intramedullary cervical spinal cord at the C3-4 level concerning for acute ischemic change with petechial hemorrhagic transformation, other etiologies including atypical demyelination with hemorrhage not excluded, although considered less likely.    Restricted diffusion with DWI hyperintensity along the bilateral medulla near the olives (4:8, 400:8), without significant T2 or corresponding FLAIR hyperintensity, with susceptibility along the upper cervicomedullary junction and cord on SWI sequences, small foci of developing ischemia not excluded.    Intracranial volume loss, nonspecific T2 and FLAIR matter hyperintensity likely sequela of microvascular disease,  additional foci radiating away from ventricles in a perpendicular fashion with a central vein sign, involving corpus callosum likely foci of of demyelination, no intracranial hemorrhage or midline shift.    Decreased T1,hyperintense T2 and STIR signal in prevertebral soft tissues concerning for edema and/or hemorrhage disruption anterior longitudinal ligament at C2 level (3:8).    Multilevel degenerative changes in the cervical spine.    ASSESSMENT:  56 yo female with a PMHx of Multiple sclerosis now quadriplegic post trauma. CT cervical spine showed no fracture, CTA showed right vertebral artery dissection of the distal V2 and V3 segments reconstituted flow within the V4 intradural segment, in keeping with traction injury to the neck. MRA N again showed R V2/V3 segment absent flow signal concerning for dissection. MRI C-Spine shows T2 hyperintensity at C3-4 level concerning for acute ischemic change with petechial hemorrhagic transformation. 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 Low suspicion for active MS flair.    RECOMMENDATIONS:  [] Would not recommend starting any DMT at this time.  [] No role for steroids at this time.  [] ASA 81 MG PO daily once cleared by neurosurgery.  [] Rest of care per primary team.    Case discussed with neurology attending Dr. Arnett.    Case seen and evaluated with resident.  Case discussed with NSCU team and stroke consult.  I agree with assessment and plan as above.  Would hold on use of MS specific medications or steroid at this time.

## 2021-04-03 NOTE — PROGRESS NOTE ADULT - SUBJECTIVE AND OBJECTIVE BOX
Vanessa Maldonado PGY1    Patient is a 55y old  Female who presents with a chief complaint of trauma (02 Apr 2021 14:16)      SUBJECTIVE / OVERNIGHT EVENTS:  - overnight - transferred from neurosurgical ICU to medicine  -     MEDICATIONS  (STANDING):  aspirin enteric coated 81 milliGRAM(s) Oral daily  chlorhexidine 4% Liquid 1 Application(s) Topical <User Schedule>  enoxaparin Injectable 40 milliGRAM(s) SubCutaneous <User Schedule>  gabapentin 300 milliGRAM(s) Oral every 8 hours  polyethylene glycol 3350 17 Gram(s) Oral every 12 hours  senna 2 Tablet(s) Oral at bedtime  sodium chloride 0.9%. 1000 milliLiter(s) (75 mL/Hr) IV Continuous <Continuous>    MEDICATIONS  (PRN):  acetaminophen   Tablet .. 650 milliGRAM(s) Oral every 6 hours PRN Temp greater or equal to 38C (100.4F), Mild Pain (1 - 3)  diazepam  Injectable 5 milliGRAM(s) IV Push every 6 hours PRN muscle spasm  ondansetron Injectable 4 milliGRAM(s) IV Push every 6 hours PRN Nausea and/or Vomiting  oxyCODONE    IR 5 milliGRAM(s) Oral every 6 hours PRN Moderate Pain (4 - 6)  oxyCODONE    IR 10 milliGRAM(s) Oral every 6 hours PRN Severe Pain (7 - 10)      CAPILLARY BLOOD GLUCOSE        I&O's Summary    02 Apr 2021 07:01  -  03 Apr 2021 07:00  --------------------------------------------------------  IN: 2158.2 mL / OUT: 2025 mL / NET: 133.2 mL    03 Apr 2021 07:01  -  03 Apr 2021 14:39  --------------------------------------------------------  IN: 458.3 mL / OUT: 1150 mL / NET: -691.7 mL        Vital Signs Last 24 Hrs  T(C): 37.1 (03 Apr 2021 11:00), Max: 37.2 (02 Apr 2021 15:00)  T(F): 98.8 (03 Apr 2021 11:00), Max: 99 (02 Apr 2021 15:00)  HR: 83 (03 Apr 2021 12:00) (63 - 85)  BP: 117/73 (03 Apr 2021 12:00) (107/62 - 132/72)  BP(mean): 87 (03 Apr 2021 12:00) (73 - 91)  RR: 19 (03 Apr 2021 12:00) (14 - 25)  SpO2: 97% (03 Apr 2021 12:00) (96% - 100%)    PHYSICAL EXAM:  GENERAL: no distress  PSYCH: A&O x3  HEAD: Atraumatic, Normocephalic  NECK: Supple, No JVD  CHEST/LUNG: clear to auscultation bilaterally  HEART: regular rate and rhythm, no murmurs  ABDOMEN: nontender to palpation, no rebound tenderness/guarding  EXTREMITIES: no edema on bilateral LE  NEUROLOGY: no focal neurologic deficit  SKIN: No rashes or lesions    LABS:                        10.8   9.53  )-----------( 191      ( 02 Apr 2021 23:15 )             34.3      04-02    142  |  111<H>  |  17  ----------------------------<  94  3.5   |  21<L>  |  0.54    Ca    7.2<L>      02 Apr 2021 23:15  Phos  2.1     04-02  Mg     1.8     04-02    TPro  6.7  /  Alb  4.1  /  TBili  0.5  /  DBili  x   /  AST  43<H>  /  ALT  27  /  AlkPhos  59  04-01    PT/INR - ( 01 Apr 2021 20:41 )   PT: 11.8 sec;   INR: 0.98 ratio         PTT - ( 01 Apr 2021 20:41 )  PTT:25.0 sec          RADIOLOGY & ADDITIONAL TESTS:    Redemonstration absent flow signal distal right V2 and right V3 vertebral artery segments of vessel irregularity at C2-3 and C3-5-6 levels, pseudoaneurysms not excluded, concerning for thrombosis and/or dissection. There is decreased flow signal of the right vertebral artery at the proximal right V4 segment with reconstitution of distal right V4 segment via left vertebral artery and vertebrobasilar confluence.    Abnormal 0.57 x 0.95 x 1.3 cm, AP, TR, CC (6:14, 3:8 5:8),  decreased T1 and hyperintense T2 and STIR signal with susceptibility on axial GRE (7:16), within the intramedullary cervical spinal cord at the C3-4 level concerning for acute ischemic change with petechial hemorrhagic transformation, other etiologies including atypical demyelination with hemorrhage not excluded, although considered less likely.    Restricted diffusion with DWI hyperintensity along the bilateral medulla near the olives (4:8, 400:8), without significant T2 or corresponding FLAIR hyperintensity, with susceptibility along the upper cervicomedullary junction and cord on SWI sequences, small foci of developing ischemia not excluded.    Intracranial volume loss, nonspecific T2 and FLAIR matter hyperintensity likely sequela of microvascular disease,  additional foci radiating away from ventricles in a perpendicular fashion with a central vein sign, involving corpus callosum likely foci of of demyelination, no intracranial hemorrhage or midline shift.    Decreased T1, hyperintense T2 and STIR signal in prevertebral soft tissues concerning for edema and/or hemorrhage disruption anterior longitudinal ligament at C2 level (3:8).    Multilevel degenerative changes  in the cervical spine as detailed above.     Vanessa Maldonado PGY1    Patient is a 55y old  Female who presents with a chief complaint of trauma (02 Apr 2021 14:16)      SUBJECTIVE / OVERNIGHT EVENTS:  - overnight - transferred from neurosurgical ICU to medicine  - am - pt is tearful, cannot move any extremities. Only sensation below neck is pins and needles. She has involuntary muscle spasms. Does not want to take oxy as it may be sedating and cause her to breath even more shallowly. Unable to cough or strain for bm.     MEDICATIONS  (STANDING):  aspirin enteric coated 81 milliGRAM(s) Oral daily  chlorhexidine 4% Liquid 1 Application(s) Topical <User Schedule>  enoxaparin Injectable 40 milliGRAM(s) SubCutaneous <User Schedule>  gabapentin 300 milliGRAM(s) Oral every 8 hours  polyethylene glycol 3350 17 Gram(s) Oral every 12 hours  senna 2 Tablet(s) Oral at bedtime  sodium chloride 0.9%. 1000 milliLiter(s) (75 mL/Hr) IV Continuous <Continuous>    MEDICATIONS  (PRN):  acetaminophen   Tablet .. 650 milliGRAM(s) Oral every 6 hours PRN Temp greater or equal to 38C (100.4F), Mild Pain (1 - 3)  diazepam  Injectable 5 milliGRAM(s) IV Push every 6 hours PRN muscle spasm  ondansetron Injectable 4 milliGRAM(s) IV Push every 6 hours PRN Nausea and/or Vomiting  oxyCODONE    IR 5 milliGRAM(s) Oral every 6 hours PRN Moderate Pain (4 - 6)  oxyCODONE    IR 10 milliGRAM(s) Oral every 6 hours PRN Severe Pain (7 - 10)      CAPILLARY BLOOD GLUCOSE        I&O's Summary    02 Apr 2021 07:01  -  03 Apr 2021 07:00  --------------------------------------------------------  IN: 2158.2 mL / OUT: 2025 mL / NET: 133.2 mL    03 Apr 2021 07:01  -  03 Apr 2021 14:39  --------------------------------------------------------  IN: 458.3 mL / OUT: 1150 mL / NET: -691.7 mL        Vital Signs Last 24 Hrs  T(C): 37.1 (03 Apr 2021 11:00), Max: 37.2 (02 Apr 2021 15:00)  T(F): 98.8 (03 Apr 2021 11:00), Max: 99 (02 Apr 2021 15:00)  HR: 83 (03 Apr 2021 12:00) (63 - 85)  BP: 117/73 (03 Apr 2021 12:00) (107/62 - 132/72)  BP(mean): 87 (03 Apr 2021 12:00) (73 - 91)  RR: 19 (03 Apr 2021 12:00) (14 - 25)  SpO2: 97% (03 Apr 2021 12:00) (96% - 100%)    PHYSICAL EXAM:  GENERAL: no distress  PSYCH: A&O x3  HEAD: Atraumatic, Normocephalic  NECK: Supple, No JVD  CHEST/LUNG: clear to auscultation bilaterally  HEART: regular rate and rhythm, no murmurs  ABDOMEN: nontender to palpation, no rebound tenderness/guarding  EXTREMITIES: no edema on bilateral LE  NEUROLOGY: no focal neurologic deficit  SKIN: No rashes or lesions    LABS:                        10.8   9.53  )-----------( 191      ( 02 Apr 2021 23:15 )             34.3      04-02    142  |  111<H>  |  17  ----------------------------<  94  3.5   |  21<L>  |  0.54    Ca    7.2<L>      02 Apr 2021 23:15  Phos  2.1     04-02  Mg     1.8     04-02    TPro  6.7  /  Alb  4.1  /  TBili  0.5  /  DBili  x   /  AST  43<H>  /  ALT  27  /  AlkPhos  59  04-01    PT/INR - ( 01 Apr 2021 20:41 )   PT: 11.8 sec;   INR: 0.98 ratio         PTT - ( 01 Apr 2021 20:41 )  PTT:25.0 sec          RADIOLOGY & ADDITIONAL TESTS:    Redemonstration absent flow signal distal right V2 and right V3 vertebral artery segments of vessel irregularity at C2-3 and C3-5-6 levels, pseudoaneurysms not excluded, concerning for thrombosis and/or dissection. There is decreased flow signal of the right vertebral artery at the proximal right V4 segment with reconstitution of distal right V4 segment via left vertebral artery and vertebrobasilar confluence.    Abnormal 0.57 x 0.95 x 1.3 cm, AP, TR, CC (6:14, 3:8 5:8),  decreased T1 and hyperintense T2 and STIR signal with susceptibility on axial GRE (7:16), within the intramedullary cervical spinal cord at the C3-4 level concerning for acute ischemic change with petechial hemorrhagic transformation, other etiologies including atypical demyelination with hemorrhage not excluded, although considered less likely.    Restricted diffusion with DWI hyperintensity along the bilateral medulla near the olives (4:8, 400:8), without significant T2 or corresponding FLAIR hyperintensity, with susceptibility along the upper cervicomedullary junction and cord on SWI sequences, small foci of developing ischemia not excluded.    Intracranial volume loss, nonspecific T2 and FLAIR matter hyperintensity likely sequela of microvascular disease,  additional foci radiating away from ventricles in a perpendicular fashion with a central vein sign, involving corpus callosum likely foci of of demyelination, no intracranial hemorrhage or midline shift.    Decreased T1, hyperintense T2 and STIR signal in prevertebral soft tissues concerning for edema and/or hemorrhage disruption anterior longitudinal ligament at C2 level (3:8).    Multilevel degenerative changes  in the cervical spine as detailed above.     Vanessa Maldonado PGY1    Patient is a 55y old  Female who presents with a chief complaint of trauma (02 Apr 2021 14:16)      SUBJECTIVE / OVERNIGHT EVENTS:  - overnight - transferred from neurosurgical ICU to medicine  - am - pt is tearful, cannot move any extremities. Only sensation below neck is pins and needles. She has involuntary muscle spasms. Does not want to take oxy as it may be sedating and cause her to breath even more shallowly. Unable to cough or strain for bm. Has guerrero in place but worried as she feels bladder spasms and thinks the balloon is obstructing the catheter.     MEDICATIONS  (STANDING):  aspirin enteric coated 81 milliGRAM(s) Oral daily  chlorhexidine 4% Liquid 1 Application(s) Topical <User Schedule>  enoxaparin Injectable 40 milliGRAM(s) SubCutaneous <User Schedule>  gabapentin 300 milliGRAM(s) Oral every 8 hours  polyethylene glycol 3350 17 Gram(s) Oral every 12 hours  senna 2 Tablet(s) Oral at bedtime  sodium chloride 0.9%. 1000 milliLiter(s) (75 mL/Hr) IV Continuous <Continuous>    MEDICATIONS  (PRN):  acetaminophen   Tablet .. 650 milliGRAM(s) Oral every 6 hours PRN Temp greater or equal to 38C (100.4F), Mild Pain (1 - 3)  diazepam  Injectable 5 milliGRAM(s) IV Push every 6 hours PRN muscle spasm  ondansetron Injectable 4 milliGRAM(s) IV Push every 6 hours PRN Nausea and/or Vomiting  oxyCODONE    IR 5 milliGRAM(s) Oral every 6 hours PRN Moderate Pain (4 - 6)  oxyCODONE    IR 10 milliGRAM(s) Oral every 6 hours PRN Severe Pain (7 - 10)      CAPILLARY BLOOD GLUCOSE        I&O's Summary    02 Apr 2021 07:01  -  03 Apr 2021 07:00  --------------------------------------------------------  IN: 2158.2 mL / OUT: 2025 mL / NET: 133.2 mL    03 Apr 2021 07:01  -  03 Apr 2021 14:39  --------------------------------------------------------  IN: 458.3 mL / OUT: 1150 mL / NET: -691.7 mL        Vital Signs Last 24 Hrs  T(C): 37.1 (03 Apr 2021 11:00), Max: 37.2 (02 Apr 2021 15:00)  T(F): 98.8 (03 Apr 2021 11:00), Max: 99 (02 Apr 2021 15:00)  HR: 83 (03 Apr 2021 12:00) (63 - 85)  BP: 117/73 (03 Apr 2021 12:00) (107/62 - 132/72)  BP(mean): 87 (03 Apr 2021 12:00) (73 - 91)  RR: 19 (03 Apr 2021 12:00) (14 - 25)  SpO2: 97% (03 Apr 2021 12:00) (96% - 100%)    PHYSICAL EXAM:  GENERAL: lying down with adjustable [place] J brace  PSYCH: A&O x3  HEAD: Atraumatic, Normocephalic  NECK: in cervical brace  CHEST/LUNG: clear to auscultation bilaterally anteriorly   HEART: regular rate and rhythm, no murmurs  ABDOMEN: pt does not have sensation but feels pins and needles with abdominal palpation  EXTREMITIES: no edema on bilateral LE or UE, no sensation to touch. warm extremities.   NEUROLOGY: quadriplegia, no sensation to touch. EOMI, able to raise eyebrows. speech coherent.   Guerrero in place draining clear urine.     LABS:                        10.8   9.53  )-----------( 191      ( 02 Apr 2021 23:15 )             34.3      04-02    142  |  111<H>  |  17  ----------------------------<  94  3.5   |  21<L>  |  0.54    Ca    7.2<L>      02 Apr 2021 23:15  Phos  2.1     04-02  Mg     1.8     04-02    TPro  6.7  /  Alb  4.1  /  TBili  0.5  /  DBili  x   /  AST  43<H>  /  ALT  27  /  AlkPhos  59  04-01    PT/INR - ( 01 Apr 2021 20:41 )   PT: 11.8 sec;   INR: 0.98 ratio         PTT - ( 01 Apr 2021 20:41 )  PTT:25.0 sec          RADIOLOGY & ADDITIONAL TESTS:    Redemonstration absent flow signal distal right V2 and right V3 vertebral artery segments of vessel irregularity at C2-3 and C3-5-6 levels, pseudoaneurysms not excluded, concerning for thrombosis and/or dissection. There is decreased flow signal of the right vertebral artery at the proximal right V4 segment with reconstitution of distal right V4 segment via left vertebral artery and vertebrobasilar confluence.    Abnormal 0.57 x 0.95 x 1.3 cm, AP, TR, CC (6:14, 3:8 5:8),  decreased T1 and hyperintense T2 and STIR signal with susceptibility on axial GRE (7:16), within the intramedullary cervical spinal cord at the C3-4 level concerning for acute ischemic change with petechial hemorrhagic transformation, other etiologies including atypical demyelination with hemorrhage not excluded, although considered less likely.    Restricted diffusion with DWI hyperintensity along the bilateral medulla near the olives (4:8, 400:8), without significant T2 or corresponding FLAIR hyperintensity, with susceptibility along the upper cervicomedullary junction and cord on SWI sequences, small foci of developing ischemia not excluded.    Intracranial volume loss, nonspecific T2 and FLAIR matter hyperintensity likely sequela of microvascular disease,  additional foci radiating away from ventricles in a perpendicular fashion with a central vein sign, involving corpus callosum likely foci of of demyelination, no intracranial hemorrhage or midline shift.    Decreased T1, hyperintense T2 and STIR signal in prevertebral soft tissues concerning for edema and/or hemorrhage disruption anterior longitudinal ligament at C2 level (3:8).    Multilevel degenerative changes  in the cervical spine as detailed above.     Vanessa Maldonado PGY1    Patient is a 55y old  Female who presents with a chief complaint of trauma (02 Apr 2021 14:16)      SUBJECTIVE / OVERNIGHT EVENTS:  - overnight - transferred from neurosurgical ICU to medicine  - am - pt is tearful, cannot move any extremities. Only sensation below neck is pins and needles. She has involuntary muscle spasms. Does not want to take oxy as it may be sedating and cause her to breath even more shallowly. Unable to cough or strain for bm. Has guerrero in place but worried as she feels bladder spasms and thinks the balloon is obstructing the catheter.     MEDICATIONS  (STANDING):  aspirin enteric coated 81 milliGRAM(s) Oral daily  chlorhexidine 4% Liquid 1 Application(s) Topical <User Schedule>  enoxaparin Injectable 40 milliGRAM(s) SubCutaneous <User Schedule>  gabapentin 300 milliGRAM(s) Oral every 8 hours  polyethylene glycol 3350 17 Gram(s) Oral every 12 hours  senna 2 Tablet(s) Oral at bedtime  sodium chloride 0.9%. 1000 milliLiter(s) (75 mL/Hr) IV Continuous <Continuous>    MEDICATIONS  (PRN):  acetaminophen   Tablet .. 650 milliGRAM(s) Oral every 6 hours PRN Temp greater or equal to 38C (100.4F), Mild Pain (1 - 3)  diazepam  Injectable 5 milliGRAM(s) IV Push every 6 hours PRN muscle spasm  ondansetron Injectable 4 milliGRAM(s) IV Push every 6 hours PRN Nausea and/or Vomiting  oxyCODONE    IR 5 milliGRAM(s) Oral every 6 hours PRN Moderate Pain (4 - 6)  oxyCODONE    IR 10 milliGRAM(s) Oral every 6 hours PRN Severe Pain (7 - 10)      CAPILLARY BLOOD GLUCOSE        I&O's Summary    02 Apr 2021 07:01  -  03 Apr 2021 07:00  --------------------------------------------------------  IN: 2158.2 mL / OUT: 2025 mL / NET: 133.2 mL    03 Apr 2021 07:01  -  03 Apr 2021 14:39  --------------------------------------------------------  IN: 458.3 mL / OUT: 1150 mL / NET: -691.7 mL        Vital Signs Last 24 Hrs  T(C): 37.1 (03 Apr 2021 11:00), Max: 37.2 (02 Apr 2021 15:00)  T(F): 98.8 (03 Apr 2021 11:00), Max: 99 (02 Apr 2021 15:00)  HR: 83 (03 Apr 2021 12:00) (63 - 85)  BP: 117/73 (03 Apr 2021 12:00) (107/62 - 132/72)  BP(mean): 87 (03 Apr 2021 12:00) (73 - 91)  RR: 19 (03 Apr 2021 12:00) (14 - 25)  SpO2: 97% (03 Apr 2021 12:00) (96% - 100%)    PHYSICAL EXAM:  GENERAL: lying down with adjustable [place] J brace  PSYCH: A&O x3  HEAD: Atraumatic, Normocephalic  NECK: in cervical brace  CHEST/LUNG: clear to auscultation bilaterally anteriorly   HEART: regular rate and rhythm, no murmurs  ABDOMEN: pt does not have sensation but feels pins and needles with abdominal palpation  EXTREMITIES: no edema on bilateral LE or UE, no sensation to touch. warm extremities.   NEUROLOGY: quadriplegia, no sensation to touch. EOMI, able to raise eyebrows. speech coherent.   Guerrero in place draining clear urine.     LABS:                        10.8   9.53  )-----------( 191      ( 02 Apr 2021 23:15 )             34.3      04-02    142  |  111<H>  |  17  ----------------------------<  94  3.5   |  21<L>  |  0.54    Ca    7.2<L>      02 Apr 2021 23:15  Phos  2.1     04-02  Mg     1.8     04-02    TPro  6.7  /  Alb  4.1  /  TBili  0.5  /  DBili  x   /  AST  43<H>  /  ALT  27  /  AlkPhos  59  04-01    PT/INR - ( 01 Apr 2021 20:41 )   PT: 11.8 sec;   INR: 0.98 ratio         PTT - ( 01 Apr 2021 20:41 )  PTT:25.0 sec          RADIOLOGY & ADDITIONAL TESTS:    Redemonstration absent flow signal distal right V2 and right V3 vertebral artery segments of vessel irregularity at C2-3 and C3-5-6 levels, pseudoaneurysms not excluded, concerning for thrombosis and/or dissection. There is decreased flow signal of the right vertebral artery at the proximal right V4 segment with reconstitution of distal right V4 segment via left vertebral artery and vertebrobasilar confluence.    Abnormal 0.57 x 0.95 x 1.3 cm, AP, TR, CC (6:14, 3:8 5:8),  decreased T1 and hyperintense T2 and STIR signal with susceptibility on axial GRE (7:16), within the intramedullary cervical spinal cord at the C3-4 level concerning for acute ischemic change with petechial hemorrhagic transformation, other etiologies including atypical demyelination with hemorrhage not excluded, although considered less likely.    Restricted diffusion with DWI hyperintensity along the bilateral medulla near the olives (4:8, 400:8), without significant T2 or corresponding FLAIR hyperintensity, with susceptibility along the upper cervicomedullary junction and cord on SWI sequences, small foci of developing ischemia not excluded.    Intracranial volume loss, nonspecific T2 and FLAIR matter hyperintensity likely sequela of microvascular disease,  additional foci radiating away from ventricles in a perpendicular fashion with a central vein sign, involving corpus callosum likely foci of of demyelination, no intracranial hemorrhage or midline shift.    Decreased T1, hyperintense T2 and STIR signal in prevertebral soft tissues concerning for edema and/or hemorrhage disruption anterior longitudinal ligament at C2 level (3:8).    Multilevel degenerative changes  in the cervical spine as detailed above.     Vanessa Maldonado PGY1    Patient is a 55y old  Female who presents with a chief complaint of trauma (02 Apr 2021 14:16)      SUBJECTIVE / OVERNIGHT EVENTS:  - overnight - transferred from neurosurgical ICU to medicine  - am - pt is tearful, cannot move any extremities. Only sensation below neck is pins and needles. She has involuntary muscle spasms. Does not want to take oxy as it may be sedating and cause her to breath even more shallowly. Unable to cough or strain for bm. Has guerrero in place but worried as she feels bladder spasms and thinks the balloon is obstructing the catheter.     MEDICATIONS  (STANDING):  aspirin enteric coated 81 milliGRAM(s) Oral daily  chlorhexidine 4% Liquid 1 Application(s) Topical <User Schedule>  enoxaparin Injectable 40 milliGRAM(s) SubCutaneous <User Schedule>  gabapentin 300 milliGRAM(s) Oral every 8 hours  polyethylene glycol 3350 17 Gram(s) Oral every 12 hours  senna 2 Tablet(s) Oral at bedtime  sodium chloride 0.9%. 1000 milliLiter(s) (75 mL/Hr) IV Continuous <Continuous>    MEDICATIONS  (PRN):  acetaminophen   Tablet .. 650 milliGRAM(s) Oral every 6 hours PRN Temp greater or equal to 38C (100.4F), Mild Pain (1 - 3)  diazepam  Injectable 5 milliGRAM(s) IV Push every 6 hours PRN muscle spasm  ondansetron Injectable 4 milliGRAM(s) IV Push every 6 hours PRN Nausea and/or Vomiting  oxyCODONE    IR 5 milliGRAM(s) Oral every 6 hours PRN Moderate Pain (4 - 6)  oxyCODONE    IR 10 milliGRAM(s) Oral every 6 hours PRN Severe Pain (7 - 10)      CAPILLARY BLOOD GLUCOSE        I&O's Summary    02 Apr 2021 07:01  -  03 Apr 2021 07:00  --------------------------------------------------------  IN: 2158.2 mL / OUT: 2025 mL / NET: 133.2 mL    03 Apr 2021 07:01  -  03 Apr 2021 14:39  --------------------------------------------------------  IN: 458.3 mL / OUT: 1150 mL / NET: -691.7 mL        Vital Signs Last 24 Hrs  T(C): 37.1 (03 Apr 2021 11:00), Max: 37.2 (02 Apr 2021 15:00)  T(F): 98.8 (03 Apr 2021 11:00), Max: 99 (02 Apr 2021 15:00)  HR: 83 (03 Apr 2021 12:00) (63 - 85)  BP: 117/73 (03 Apr 2021 12:00) (107/62 - 132/72)  BP(mean): 87 (03 Apr 2021 12:00) (73 - 91)  RR: 19 (03 Apr 2021 12:00) (14 - 25)  SpO2: 97% (03 Apr 2021 12:00) (96% - 100%)    PHYSICAL EXAM:  GENERAL: lying down with adjustable [place] J brace  PSYCH: A&O x3  HEAD: Atraumatic, Normocephalic  NECK: in cervical brace  CHEST/LUNG: clear to auscultation bilaterally anteriorly   HEART: regular rate and rhythm, no murmurs  ABDOMEN: pt does maintain some sensation, mostly feels pins and needles with abdominal palpation  EXTREMITIES: no edema on bilateral LE or UE, no sensation to touch. warm extremities.   NEUROLOGY: quadriplegia, some sensation to touch. EOMI, able to raise eyebrows. speech coherent.   Guerrero in place draining clear urine.     LABS:                        10.8   9.53  )-----------( 191      ( 02 Apr 2021 23:15 )             34.3      04-02    142  |  111<H>  |  17  ----------------------------<  94  3.5   |  21<L>  |  0.54    Ca    7.2<L>      02 Apr 2021 23:15  Phos  2.1     04-02  Mg     1.8     04-02    TPro  6.7  /  Alb  4.1  /  TBili  0.5  /  DBili  x   /  AST  43<H>  /  ALT  27  /  AlkPhos  59  04-01    PT/INR - ( 01 Apr 2021 20:41 )   PT: 11.8 sec;   INR: 0.98 ratio         PTT - ( 01 Apr 2021 20:41 )  PTT:25.0 sec          RADIOLOGY & ADDITIONAL TESTS:    Redemonstration absent flow signal distal right V2 and right V3 vertebral artery segments of vessel irregularity at C2-3 and C3-5-6 levels, pseudoaneurysms not excluded, concerning for thrombosis and/or dissection. There is decreased flow signal of the right vertebral artery at the proximal right V4 segment with reconstitution of distal right V4 segment via left vertebral artery and vertebrobasilar confluence.    Abnormal 0.57 x 0.95 x 1.3 cm, AP, TR, CC (6:14, 3:8 5:8),  decreased T1 and hyperintense T2 and STIR signal with susceptibility on axial GRE (7:16), within the intramedullary cervical spinal cord at the C3-4 level concerning for acute ischemic change with petechial hemorrhagic transformation, other etiologies including atypical demyelination with hemorrhage not excluded, although considered less likely.    Restricted diffusion with DWI hyperintensity along the bilateral medulla near the olives (4:8, 400:8), without significant T2 or corresponding FLAIR hyperintensity, with susceptibility along the upper cervicomedullary junction and cord on SWI sequences, small foci of developing ischemia not excluded.    Intracranial volume loss, nonspecific T2 and FLAIR matter hyperintensity likely sequela of microvascular disease,  additional foci radiating away from ventricles in a perpendicular fashion with a central vein sign, involving corpus callosum likely foci of of demyelination, no intracranial hemorrhage or midline shift.    Decreased T1, hyperintense T2 and STIR signal in prevertebral soft tissues concerning for edema and/or hemorrhage disruption anterior longitudinal ligament at C2 level (3:8).    Multilevel degenerative changes  in the cervical spine as detailed above.

## 2021-04-03 NOTE — PROGRESS NOTE ADULT - SUBJECTIVE AND OBJECTIVE BOX
55F w/ pmh Multiple sclerosis now quadriplegic post trauma   MRI cervical spine and brain showed: absent flow signal distal right V2 and right V3 vertebral artery segments of vessel irregularity at C2-3 and C3-5-6 levels, pseudoaneurysms not excluded, concerning for thrombosis and/or dissection. There is decreased flow signal of the right vertebral artery at the proximal right V4 segment with reconstitution of distal right V4 segment via left vertebral artery and vertebrobasilar confluence.  Abnormal 0.57 x 0.95 x 1.3 cm, AP, TR, CC (6:14, 3:8 5:8),  decreased T1 and hyperintense T2 and STIR signal with susceptibility on axial GRE (7:16), within the intramedullary cervical spinal cord at the C3-4 level concerning for acute ischemic change with petechial hemorrhagic transformation, other etiologies including atypical demyelination with hemorrhage not excluded, although considered less likely.  Restricted diffusion with DWI hyperintensity along the bilateral medulla near the olives (4:8, 400:8), without significant T2 or corresponding FLAIR hyperintensity, with susceptibility along the upper cervicomedullary junction and cord on SWI sequences, small foci of developing ischemia not excluded.  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 WIll discuss today with her      on PO diet    aferbile   heparin sc for DVT prophylaxis cleared by NS    Discharge planning:     Code Status: [x] Full Code [] DNR [] DNI [] Goals of Care:   Disposition: [x] ICU [] Stroke Unit [] RCU []PCU []Floor [] Discharge Home     Patient at high risk for neurologic deterioration respiratory failure,  PE    critical care time, excluding procedures: 30 minutes

## 2021-04-03 NOTE — CONSULT NOTE ADULT - ASSESSMENT
Recommendation: The plan of care should closely resemble the patient’s wishes. Ms. Cochran has been clear about her wishes. She would not like to be "hooked up to machines" and has rationally reasoned through her choices (understood as refusals). The plan of care can only be elucidated through a goals of care conversation that should include her family. In these goals of care meetings, the team should present an honest view of the prognosis and potential quality of life for this particular patient. Ethics can assist in these matters. There are complex factors in this case (the patient is a board-certified anesthesiologist). In a situation like this, the patient-doctor not only sees herself as a patient, but also as her own doctor. It is essential, then, to include the patient in treatment decisions and engage in shared decision making.     Beneficence calls on practitioners to promote the best possible health or quality of living with aggressive interventions when these help prolong life with "good quality." Also, to provide comfort care when cure and remission are not possible, and the aim is to achieve the best "quality of dying." The same is true for the clinician’s duty to nonmaleficence, avoiding medical interventions whose risk and burden exceeds their benefit.    Thank you for including the Ethics Consultation Service. Ethics commends the team for their virtue in the care and support for Ms. Cochran and her family. Ethics will remain available for any discussions and decision points that may occur.     CASE DISCUSSED with ATTENDING: DOUGLAS MERRILL, BISHOP, DEVON-C, and CHARLIE PADRON, Rin, [facility], [facility]    CASE REPORT WRITTEN BY: SHANEKA HARMON JD, MS, HE-C (Ethics Fellow)    MORE THAN 50% OF THE TIME OF THIS CONSULTATION WAS SPENT IN COORDINATION OF CARE OF PATIENT.	    References:     i. Komal TL & Ludmila JF. Principles of Biomedical Ethics. [place]: [employer]; 2019.

## 2021-04-03 NOTE — DISCHARGE NOTE PROVIDER - NSDCMRMEDTOKEN_GEN_ALL_CORE_FT
acetaminophen 325 mg oral tablet: 2 tab(s) orally every 6 hours, As needed, Temp greater or equal to 38C (100.4F), Mild Pain (1 - 3)  aspirin 81 mg oral delayed release tablet: 1 tab(s) orally once a day  baclofen 5 mg oral tablet: 1 tab(s) orally 3 times a day, As needed, Muscle Spasm  belladonna-opium 16.2 mg-30 mg rectal suppository: 1 suppository(ies) rectal once a day, As needed, bladder spam  bisacodyl 10 mg rectal suppository: 1 suppository(ies) rectal once a day, As needed, Constipation  enoxaparin: 30 milligram(s) subcutaneous once a day  oxyCODONE 10 mg oral tablet: 1 tab(s) orally every 6 hours, As needed, Severe Pain (7 - 10)  oxyCODONE 5 mg oral tablet: 1 tab(s) orally every 6 hours, As needed, Moderate Pain (4 - 6)  polyethylene glycol 3350 oral powder for reconstitution: 17 gram(s) orally every 12 hours  senna oral tablet: 2 tab(s) orally once a day (at bedtime)  Zofran: 4 milligram(s) injectable every 6 hours, As Needed

## 2021-04-03 NOTE — PROGRESS NOTE ADULT - PROBLEM SELECTOR PLAN 2
- dx with MS; follow w Dr. Madrid at [facility], was told she is JUDY pos, has been off disease modifying therapy for at least 1 yr for concern of being immunosuppressed during COVID.    - neuro following - no recs - dx with MS; follow w Dr. Madrid at [facility], was told she is JUDY pos, has been off disease modifying therapy for at least 1 yr for concern of being immunosuppressed during COVID.    - neuro following - not recommending disease modifying therapy at this time and unlikely MS flair contributing to her presentation - pt has poor inspiratory effort and unable to cough likely 2/2 spinal cord injury as above leading to diaphragm muscle weakness  - has not needed respiratory support since admission, on RA spo2 wnl but high risk for atelectasis given poor inspiratory effort.   - physical therapy on board. monitor spo2.
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
## 2021-04-03 NOTE — DISCHARGE NOTE PROVIDER - CARE PROVIDERS DIRECT ADDRESSES
[email or web address],[email or web address]

## 2021-04-03 NOTE — PROGRESS NOTE ADULT - PROBLEM SELECTOR PLAN 3
- bilateral below knee carroll vein DVT  - high risk for progression as pt is quadriplegic. - dx with MS; follow w Dr. Madrid at [facility], was told she is JUDY pos, has been off disease modifying therapy for at least 1 yr for concern of being immunosuppressed during COVID.    - neuro following - not recommending disease modifying therapy at this time and unlikely MS flair contributing to her presentation

## 2021-04-03 NOTE — DISCHARGE NOTE PROVIDER - NSDCFUADDAPPT_GEN_ALL_CORE_FT
Please follow up with Dr. Libman, neurologist, within 1-2 weeks of discharge from the rehabilitation center. Please follow up within 6 weeks to repeat MRI head and neck for surveillance scan.    Please follow up with Dr. Menrad, vascular cardiologist, within 1-2 weeks of discharge from rehabilitation center to monitor soleal deep vein thrombosis.  Please follow up with Dr. Libman, neurologist, within 1-2 weeks of discharge from the rehabilitation center. Please follow up within 6 weeks to repeat MRI head and neck for surveillance scan.    Please follow up with Dr. Menard, vascular cardiologist, within 1-2 weeks of discharge from rehabilitation center to monitor soleal deep vein thrombosis.     Please follow up with Dr. Heard, physical rehab physician, within 1-2 weeks of discharge from rehabilitation center.

## 2021-04-03 NOTE — PROGRESS NOTE ADULT - PROBLEM SELECTOR PLAN 4
- palliative and ethics on board. Ethics - pt can make decision to be DNR/DNI and refuse treatment  - pt understands her medical condition - pt has uterine prolapse, also cystocele   - guerrero in place as pt unable to urinate without it. gyn following. draining clear urine. - bilateral below knee carroll vein DVT  - high risk for progression as pt is quadriplegic. - bilateral below knee carroll vein DVT  - high risk for progression as pt is quadriplegic.  - ivc filter - vascular surgery, call monday as pt currently does not want   - prior to discharge to rehab - repeat duplex
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
## 2021-04-03 NOTE — PROGRESS NOTE ADULT - PROBLEM SELECTOR PLAN 7
- on lovenox 40mg QD   - diet - dysphagia 1. Also on mIVF 75 cc/hr. - palliative and ethics on board. Ethics - pt can make decision to be DNR/DNI and refuse treatment  - pt understands her medical condition - palliative and ethics on board. Ethics - pt can make decision to be DNR/DNI and refuse treatments

## 2021-04-03 NOTE — DISCHARGE NOTE PROVIDER - NSDCCPTREATMENT_GEN_ALL_CORE_FT
PRINCIPAL PROCEDURE  Procedure: MRA neck w/w/o contrast  Findings and Treatment: IMPRESSION:  Redemonstration absent flow signal distal right V2 and right V3 vertebral artery segments of vessel irregularity at C2-3 and C3-5-6 levels, pseudoaneurysms not excluded, concerning for thrombosis and/or dissection. There is decreased flow signal of the right vertebral artery at the proximal right V4 segment with reconstitution of distal right V4 segment via left vertebral artery and vertebrobasilar confluence.  Abnormal 0.57 x 0.95 x 1.3 cm, AP, TR, CC (6:14, 3:8 5:8),  decreased T1 and hyperintense T2 and STIR signal with susceptibility on axial GRE (7:16), within the intramedullary cervical spinal cord at the C3-4 level concerning for acute ischemic change with petechial hemorrhagic transformation, other etiologies including atypical demyelination with hemorrhage not excluded, although considered less likely.  Restricted diffusion with DWI hyperintensity along the bilateral medulla near the olives (4:8, 400:8), without significant T2 or corresponding FLAIR hyperintensity, with susceptibility along the upper cervicomedullary junction and cord on SWI sequences, small foci of developing ischemia not excluded.  Intracranial volume loss, nonspecific T2 and FLAIR matter hyperintensity likely sequela of microvascular disease,  additional foci radiating away from ventricles in a perpendicular fashion with a central vein sign, involving corpus callosum likely foci of of demyelination, no intracranial hemorrhage or midline shift.  Decreased T1,hyperintense T2 and STIR signal in prevertebral soft tissues concerning for edema and/or hemorrhage disruption anterior longitudinal ligament at C2 level (3:8).  Multilevel degenerative changes  in the cervical spine as detailed above.

## 2021-04-03 NOTE — PROGRESS NOTE ADULT - SUBJECTIVE AND OBJECTIVE BOX
UROLOGY DAILY PROGRESS NOTE:     Subjective: Patient seen and examined at bedside. Guerrero placed by urology on 4/2 due to retention from spinal cord injury. Pt states at home sometimes she feels pressure in abdomen and needs to press on the cystocele to release urine in bladder. States she felt sharp spasms a few times today but is currently comfortable. Guerrero draining clear yellow urine upon examination. Guerrero catheter was flushed with 60cc of NS 2-3 times, easily able to aspirate clear fluid without resistance. Pt insisted on deflating the balloon to make sure the guerrero was not being obstructed by the mass, so balloon was taken down and reinflated and catheter was again flushed with NS to ensure it is in the correct location. Guerrero draining appropriately.       Objective:  Vital signs  T(F): , Max: 98.8 (04-03-21 @ 08:00)  HR: 81 (04-03-21 @ 16:34)  BP: 116/74 (04-03-21 @ 16:34)  SpO2: 97% (04-03-21 @ 16:34)  Wt(kg): --    I&O's Summary    02 Apr 2021 07:01  -  03 Apr 2021 07:00  --------------------------------------------------------  IN: 2158.2 mL / OUT: 2025 mL / NET: 133.2 mL    03 Apr 2021 07:01  -  03 Apr 2021 18:55  --------------------------------------------------------  IN: 458.3 mL / OUT: 1150 mL / NET: -691.7 mL    I&O's Detail    02 Apr 2021 07:01  -  03 Apr 2021 07:00  --------------------------------------------------------  IN:    IV PiggyBack: 433.2 mL    sodium chloride 0.9%: 1725 mL  Total IN: 2158.2 mL    OUT:    Indwelling Catheter - Urethral (mL): 2025 mL  Total OUT: 2025 mL    Total NET: 133.2 mL      03 Apr 2021 07:01  -  03 Apr 2021 18:57  --------------------------------------------------------  IN:    IV PiggyBack: 83.3 mL    sodium chloride 0.9%: 375 mL  Total IN: 458.3 mL    OUT:    Indwelling Catheter - Urethral (mL): 1150 mL  Total OUT: 1150 mL    Total NET: -691.7 mL          Gen: NAD  Pulm: No respiratory distress  CV: RRR,   Abd: Soft, NT, ND  : + cystocele, guerrero in place draining clear yellow urine     Labs:  04-02  9.53  / 34.3  /0.54   04-01  13.52 / 42.1  /0.77                           10.8   9.53  )-----------( 191      ( 02 Apr 2021 23:15 )             34.3     04-02    142  |  111<H>  |  17  ----------------------------<  94  3.5   |  21<L>  |  0.54    Ca    7.2<L>      02 Apr 2021 23:15  Phos  2.1     04-02  Mg     1.8     04-02    TPro  6.7  /  Alb  4.1  /  TBili  0.5  /  DBili  x   /  AST  43<H>  /  ALT  27  /  AlkPhos  59  04-01    PT/INR - ( 01 Apr 2021 20:41 )   PT: 11.8 sec;   INR: 0.98 ratio         PTT - ( 01 Apr 2021 20:41 )  PTT:25.0 sec
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
## 2021-04-03 NOTE — PROGRESS NOTE ADULT - ASSESSMENT
Ms. Cochran is a 56 y/o woman with hx MS and arrythmia? 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 Transferred from NSICU to medicine 4/3. Guerrero placed on 4/2 by urology team for retention. Patient concerned guerrero kinked or being obstructed by mass, called to trouble shoot guerrero. Guerrero draining appropriately.     -guerrero in place, continue guerrero   -pt likely describing discomfort due to bladder spasms, may try belladona rectal suppository prn  -if guerrero does not drain or patient becomes uncomfortable, please page urology at [phone]
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
## 2021-04-03 NOTE — PROGRESS NOTE ADULT - PROBLEM SELECTOR PLAN 5
- on lovenox 40mg QD   - diet - dysphagia 1. Also on mIVF 75 cc/hr. - pt has had to strain very hard and manually disimpact at home for bm recently due to worsening prolapse?   - last bm 4/1  - enema ordered today as pt would like to try. does not have any sensation in abdomen, will need close monitoring of bms to ensure she does not develop sbo as she is on opioids and immobilized. - pt has uterine prolapse, also cystocele   - guerrero in place as pt unable to urinate without it. gyn following. draining clear urine. - pt has uterine prolapse, also cystocele   - guerrero in place as pt unable to urinate without it. gyn following. draining clear urine.  - will touch base with urology as pt unable to urinate with guerrero, concern for obstruction/need to readjusting

## 2021-04-03 NOTE — DISCHARGE NOTE PROVIDER - NSFOLLOWUPCLINICS_GEN_ALL_ED_FT
[facility]  Internal Medicine  [address] [place] [address]  Phone: [phone]  Fax: [phone]

## 2021-04-03 NOTE — PROGRESS NOTE ADULT - PROBLEM SELECTOR PLAN 6
- palliative and ethics on board. Ethics - pt can make decision to be DNR/DNI and refuse treatment  - pt understands her medical condition - pt has had to strain very hard and manually disimpact at home for bm recently due to worsening prolapse?   - last bm 4/1  - enema ordered today as pt would like to try. does not have any sensation in abdomen, will need close monitoring of bms to ensure she does not develop sbo as she is on opioids and immobilized.

## 2021-04-04 LAB
ANION GAP SERPL CALC-SCNC: 9 MMOL/L — SIGNIFICANT CHANGE UP (ref 5–17)
BUN SERPL-MCNC: 14 MG/DL — SIGNIFICANT CHANGE UP (ref 7–23)
CALCIUM SERPL-MCNC: 8.5 MG/DL — SIGNIFICANT CHANGE UP (ref 8.4–10.5)
CHLORIDE SERPL-SCNC: 107 MMOL/L — SIGNIFICANT CHANGE UP (ref 96–108)
CO2 SERPL-SCNC: 26 MMOL/L — SIGNIFICANT CHANGE UP (ref 22–31)
CREAT SERPL-MCNC: 0.72 MG/DL — SIGNIFICANT CHANGE UP (ref 0.5–1.3)
GLUCOSE SERPL-MCNC: 86 MG/DL — SIGNIFICANT CHANGE UP (ref 70–99)
HCT VFR BLD CALC: 39.7 % — SIGNIFICANT CHANGE UP (ref 34.5–45)
HGB BLD-MCNC: 12.5 G/DL — SIGNIFICANT CHANGE UP (ref 11.5–15.5)
MAGNESIUM SERPL-MCNC: 1.9 MG/DL — SIGNIFICANT CHANGE UP (ref 1.6–2.6)
MCHC RBC-ENTMCNC: 30.6 PG — SIGNIFICANT CHANGE UP (ref 27–34)
MCHC RBC-ENTMCNC: 31.5 GM/DL — LOW (ref 32–36)
MCV RBC AUTO: 97.3 FL — SIGNIFICANT CHANGE UP (ref 80–100)
NRBC # BLD: 0 /100 WBCS — SIGNIFICANT CHANGE UP (ref 0–0)
PHOSPHATE SERPL-MCNC: 3.1 MG/DL — SIGNIFICANT CHANGE UP (ref 2.5–4.5)
PLATELET # BLD AUTO: 204 K/UL — SIGNIFICANT CHANGE UP (ref 150–400)
POTASSIUM SERPL-MCNC: 4.1 MMOL/L — SIGNIFICANT CHANGE UP (ref 3.5–5.3)
POTASSIUM SERPL-SCNC: 4.1 MMOL/L — SIGNIFICANT CHANGE UP (ref 3.5–5.3)
RBC # BLD: 4.08 M/UL — SIGNIFICANT CHANGE UP (ref 3.8–5.2)
RBC # FLD: 13.3 % — SIGNIFICANT CHANGE UP (ref 10.3–14.5)
SODIUM SERPL-SCNC: 142 MMOL/L — SIGNIFICANT CHANGE UP (ref 135–145)
WBC # BLD: 13.45 K/UL — HIGH (ref 3.8–10.5)
WBC # FLD AUTO: 13.45 K/UL — HIGH (ref 3.8–10.5)

## 2021-04-04 PROCEDURE — 99233 SBSQ HOSP IP/OBS HIGH 50: CPT | Mod: GC

## 2021-04-04 RX ADMIN — Medication 81 MILLIGRAM(S): at 13:31

## 2021-04-04 RX ADMIN — POLYETHYLENE GLYCOL 3350 17 GRAM(S): 17 POWDER, FOR SOLUTION ORAL at 17:02

## 2021-04-04 RX ADMIN — Medication 5 MILLIGRAM(S): at 23:32

## 2021-04-04 RX ADMIN — POLYETHYLENE GLYCOL 3350 17 GRAM(S): 17 POWDER, FOR SOLUTION ORAL at 06:40

## 2021-04-04 RX ADMIN — OXYCODONE HYDROCHLORIDE 5 MILLIGRAM(S): 5 TABLET ORAL at 01:59

## 2021-04-04 RX ADMIN — OXYCODONE HYDROCHLORIDE 5 MILLIGRAM(S): 5 TABLET ORAL at 02:30

## 2021-04-04 RX ADMIN — ONDANSETRON 4 MILLIGRAM(S): 8 TABLET, FILM COATED ORAL at 17:01

## 2021-04-04 NOTE — PROGRESS NOTE ADULT - ATTENDING COMMENTS
Agree with assessment and plan as above by Dr. Maldonado. Reviewed all pertinent labs, glucose values, and imaging studies. Modifications made as indicated above.     Percy Moore D.O  [phone] Agree with assessment and plan as above by Dr. Maldonado. Reviewed all pertinent labs, glucose values, and imaging studies. Modifications made as indicated above. Pt. to be fitted for cervical collar. Repeat MRI as outpatient. Neuro surgery eval appreciated.     Percy Moore D.O  [phone]

## 2021-04-04 NOTE — PROGRESS NOTE ADULT - SUBJECTIVE AND OBJECTIVE BOX
Vanessa Maldonado PGY1    Patient is a 55y old  Female who presents with a chief complaint of trauma (02 Apr 2021 14:16)      SUBJECTIVE / OVERNIGHT EVENTS:  - overnight -   - am -     MEDICATIONS  (STANDING):  aspirin enteric coated 81 milliGRAM(s) Oral daily  enoxaparin Injectable 40 milliGRAM(s) SubCutaneous <User Schedule>  gabapentin 300 milliGRAM(s) Oral every 8 hours  lactated ringers. 1000 milliLiter(s) (75 mL/Hr) IV Continuous <Continuous>  polyethylene glycol 3350 17 Gram(s) Oral every 12 hours  senna 2 Tablet(s) Oral at bedtime    MEDICATIONS  (PRN):  acetaminophen   Tablet .. 650 milliGRAM(s) Oral every 6 hours PRN Temp greater or equal to 38C (100.4F), Mild Pain (1 - 3)  belladonna 16.2 mG/opium 30 mg Suppository 1 Suppository(s) Rectal daily PRN bladder spam  diazepam  Injectable 5 milliGRAM(s) IV Push every 6 hours PRN muscle spasm  ondansetron Injectable 4 milliGRAM(s) IV Push every 6 hours PRN Nausea and/or Vomiting  oxyCODONE    IR 5 milliGRAM(s) Oral every 6 hours PRN Moderate Pain (4 - 6)  oxyCODONE    IR 10 milliGRAM(s) Oral every 6 hours PRN Severe Pain (7 - 10)      CAPILLARY BLOOD GLUCOSE        I&O's Summary    03 Apr 2021 07:01  -  04 Apr 2021 07:00  --------------------------------------------------------  IN: 1419.3 mL / OUT: 2950 mL / NET: -1530.7 mL        Vital Signs Last 24 Hrs  T(C): 36.3 (04 Apr 2021 06:32), Max: 37.4 (03 Apr 2021 20:41)  T(F): 97.3 (04 Apr 2021 06:32), Max: 99.4 (03 Apr 2021 20:41)  HR: 84 (04 Apr 2021 06:32) (80 - 89)  BP: 116/76 (04 Apr 2021 06:32) (105/64 - 132/72)  BP(mean): 87 (03 Apr 2021 12:00) (80 - 89)  RR: 18 (04 Apr 2021 06:32) (18 - 19)  SpO2: 94% (04 Apr 2021 06:32) (93% - 100%)    PHYSICAL EXAM:  GENERAL: lying down with adjustable [place] J brace  PSYCH: A&O x3  HEAD: Atraumatic, Normocephalic  NECK: in cervical brace  CHEST/LUNG: clear to auscultation bilaterally anteriorly   HEART: regular rate and rhythm, no murmurs  ABDOMEN: pt does maintain some sensation, mostly feels pins and needles with abdominal palpation  EXTREMITIES: no edema on bilateral LE or UE, no sensation to touch. warm extremities.   NEUROLOGY: quadriplegia, some sensation to touch. EOMI, able to raise eyebrows. speech coherent.   Sanchez in place draining clear urine.   LABS:                        12.5   13.45 )-----------( 204      ( 04 Apr 2021 07:17 )             39.7      04-04    142  |  107  |  14  ----------------------------<  86  4.1   |  26  |  0.72    Ca    8.5      04 Apr 2021 07:17  Phos  3.1     04-04  Mg     1.9     04-04                RADIOLOGY & ADDITIONAL TESTS:    Imaging Personally Reviewed:    Consultant(s) Notes Reviewed:      Care Discussed with Consultants/Other Providers:   Vanessa Erica PGY1    Patient is a 55y old  Female who presents with a chief complaint of trauma (02 Apr 2021 14:16)      SUBJECTIVE / OVERNIGHT EVENTS:  - overnight - no acute events  - am - pt would like to drink thin liquids, currently drinking thin liquids, understands aspiration risk as swallow eval rec nectal thickened consistency. repeat swallow eval pending. oxy helped pt's pain. guerrero in place draining clear urine.     MEDICATIONS  (STANDING):  aspirin enteric coated 81 milliGRAM(s) Oral daily  enoxaparin Injectable 40 milliGRAM(s) SubCutaneous <User Schedule>  gabapentin 300 milliGRAM(s) Oral every 8 hours  lactated ringers. 1000 milliLiter(s) (75 mL/Hr) IV Continuous <Continuous>  polyethylene glycol 3350 17 Gram(s) Oral every 12 hours  senna 2 Tablet(s) Oral at bedtime    MEDICATIONS  (PRN):  acetaminophen   Tablet .. 650 milliGRAM(s) Oral every 6 hours PRN Temp greater or equal to 38C (100.4F), Mild Pain (1 - 3)  belladonna 16.2 mG/opium 30 mg Suppository 1 Suppository(s) Rectal daily PRN bladder spam  diazepam  Injectable 5 milliGRAM(s) IV Push every 6 hours PRN muscle spasm  ondansetron Injectable 4 milliGRAM(s) IV Push every 6 hours PRN Nausea and/or Vomiting  oxyCODONE    IR 5 milliGRAM(s) Oral every 6 hours PRN Moderate Pain (4 - 6)  oxyCODONE    IR 10 milliGRAM(s) Oral every 6 hours PRN Severe Pain (7 - 10)      CAPILLARY BLOOD GLUCOSE        I&O's Summary    03 Apr 2021 07:01  -  04 Apr 2021 07:00  --------------------------------------------------------  IN: 1419.3 mL / OUT: 2950 mL / NET: -1530.7 mL        Vital Signs Last 24 Hrs  T(C): 36.3 (04 Apr 2021 06:32), Max: 37.4 (03 Apr 2021 20:41)  T(F): 97.3 (04 Apr 2021 06:32), Max: 99.4 (03 Apr 2021 20:41)  HR: 84 (04 Apr 2021 06:32) (80 - 89)  BP: 116/76 (04 Apr 2021 06:32) (105/64 - 132/72)  BP(mean): 87 (03 Apr 2021 12:00) (80 - 89)  RR: 18 (04 Apr 2021 06:32) (18 - 19)  SpO2: 94% (04 Apr 2021 06:32) (93% - 100%)    PHYSICAL EXAM:  GENERAL: lying down with adjustable [place] J brace in place  PSYCH: A&O x3  HEAD: Atraumatic, Normocephalic  NECK: in cervical brace  CHEST/LUNG: clear to auscultation bilaterally anteriorly   HEART: regular rate and rhythm, no murmurs  ABDOMEN: pt does maintain some sensation, mostly feels pins and needles with abdominal palpation  EXTREMITIES: no edema on bilateral LE or UE, no sensation to touch. warm extremities.   NEUROLOGY: quadriplegia, some sensation to touch. EOMI, able to raise eyebrows. speech coherent.   Guerrero in place draining clear urine.   LABS:                        12.5   13.45 )-----------( 204      ( 04 Apr 2021 07:17 )             39.7      04-04    142  |  107  |  14  ----------------------------<  86  4.1   |  26  |  0.72    Ca    8.5      04 Apr 2021 07:17  Phos  3.1     04-04  Mg     1.9     04-04                RADIOLOGY & ADDITIONAL TESTS:    Imaging Personally Reviewed:    Consultant(s) Notes Reviewed:      Care Discussed with Consultants/Other Providers:   Vanessa Powersathy PGY1    Patient is a 55y old  Female who presents with a chief complaint of trauma (02 Apr 2021 14:16)      SUBJECTIVE / OVERNIGHT EVENTS:  - overnight - no acute events  - am - pt would like to drink thin liquids, currently drinking thin liquids, understands aspiration risk as swallow eval rec nectal thickened consistency. oxy helped pt's pain. guerrero in place draining clear urine.     MEDICATIONS  (STANDING):  aspirin enteric coated 81 milliGRAM(s) Oral daily  enoxaparin Injectable 40 milliGRAM(s) SubCutaneous <User Schedule>  gabapentin 300 milliGRAM(s) Oral every 8 hours  lactated ringers. 1000 milliLiter(s) (75 mL/Hr) IV Continuous <Continuous>  polyethylene glycol 3350 17 Gram(s) Oral every 12 hours  senna 2 Tablet(s) Oral at bedtime    MEDICATIONS  (PRN):  acetaminophen   Tablet .. 650 milliGRAM(s) Oral every 6 hours PRN Temp greater or equal to 38C (100.4F), Mild Pain (1 - 3)  belladonna 16.2 mG/opium 30 mg Suppository 1 Suppository(s) Rectal daily PRN bladder spam  diazepam  Injectable 5 milliGRAM(s) IV Push every 6 hours PRN muscle spasm  ondansetron Injectable 4 milliGRAM(s) IV Push every 6 hours PRN Nausea and/or Vomiting  oxyCODONE    IR 5 milliGRAM(s) Oral every 6 hours PRN Moderate Pain (4 - 6)  oxyCODONE    IR 10 milliGRAM(s) Oral every 6 hours PRN Severe Pain (7 - 10)      CAPILLARY BLOOD GLUCOSE        I&O's Summary    03 Apr 2021 07:01  -  04 Apr 2021 07:00  --------------------------------------------------------  IN: 1419.3 mL / OUT: 2950 mL / NET: -1530.7 mL        Vital Signs Last 24 Hrs  T(C): 36.3 (04 Apr 2021 06:32), Max: 37.4 (03 Apr 2021 20:41)  T(F): 97.3 (04 Apr 2021 06:32), Max: 99.4 (03 Apr 2021 20:41)  HR: 84 (04 Apr 2021 06:32) (80 - 89)  BP: 116/76 (04 Apr 2021 06:32) (105/64 - 132/72)  BP(mean): 87 (03 Apr 2021 12:00) (80 - 89)  RR: 18 (04 Apr 2021 06:32) (18 - 19)  SpO2: 94% (04 Apr 2021 06:32) (93% - 100%)    PHYSICAL EXAM:  GENERAL: lying down with adjustable [place] J brace in place  PSYCH: A&O x3  HEAD: Atraumatic, Normocephalic  NECK: in cervical brace  CHEST/LUNG: clear to auscultation bilaterally anteriorly   HEART: regular rate and rhythm, no murmurs  ABDOMEN: pt does maintain some sensation, mostly feels pins and needles with abdominal palpation  EXTREMITIES: no edema on bilateral LE or UE, no sensation to touch. warm extremities.   NEUROLOGY: quadriplegia, some sensation to touch. EOMI, able to raise eyebrows. speech coherent.   Guerrero in place draining clear urine.   LABS:                        12.5   13.45 )-----------( 204      ( 04 Apr 2021 07:17 )             39.7      04-04    142  |  107  |  14  ----------------------------<  86  4.1   |  26  |  0.72    Ca    8.5      04 Apr 2021 07:17  Phos  3.1     04-04  Mg     1.9     04-04                RADIOLOGY & ADDITIONAL TESTS:    Imaging Personally Reviewed:    Consultant(s) Notes Reviewed:      Care Discussed with Consultants/Other Providers:

## 2021-04-04 NOTE — PROGRESS NOTE ADULT - PROBLEM SELECTOR PLAN 5
- pt has uterine prolapse, also cystocele   - guerrero in place as pt unable to urinate without it. gyn following. draining clear urine.  - will touch base with urology as pt unable to urinate with guerrero, concern for obstruction/need to readjusting - pt has uterine prolapse, also cystocele   - guerrero in place as pt unable to urinate without it. gyn following. draining clear urine.  - urology assessed guerrero 4/3, no concern for obstruction/kink, rec belladonna suppository if bladder spams - ordered prn.

## 2021-04-04 NOTE — PROGRESS NOTE ADULT - ASSESSMENT
Pt. requires cervical collar.  Device to limit ROM, support spine, reduce discomfort, promote healing.

## 2021-04-04 NOTE — PROGRESS NOTE ADULT - PROBLEM SELECTOR PLAN 3
- dx with MS; follow w Dr. Madrid at [facility], was told she is JUDY pos, has been off disease modifying therapy for at least 1 yr for concern of being immunosuppressed during COVID.    - neuro following - not recommending disease modifying therapy at this time and unlikely MS flair contributing to her presentation

## 2021-04-04 NOTE — PROGRESS NOTE ADULT - PROBLEM SELECTOR PLAN 8
- on lovenox 40mg QD   - diet - dysphagia 1. Also on mIVF 75 cc/hr. - on lovenox 40mg QD. ivc filter discussion with vascular and pt monday. repeat duplex prior to d/c to rehab.    - diet - dysphagia 1. Also on mIVF 75 cc/hr. pt drinking thin liquids and understands aspiration risk - repeat swallow eval pending

## 2021-04-04 NOTE — PROGRESS NOTE ADULT - PROBLEM SELECTOR PLAN 6
- pt has had to strain very hard and manually disimpact at home for bm recently due to worsening prolapse?   - last bm 4/1  - enema ordered today as pt would like to try. does not have any sensation in abdomen, will need close monitoring of bms to ensure she does not develop sbo as she is on opioids and immobilized. - pt has had to strain very hard and manually disimpact at home for bm recently due to worsening prolapse  - last bm 4/1  - enema ordered today as pt would like to try but then refusing. does not have any sensation in abdomen, will need close monitoring of bms to ensure she does not develop sbo as she is on opioids and immobilized.

## 2021-04-04 NOTE — PROGRESS NOTE ADULT - PROBLEM SELECTOR PLAN 1
Pt. has cervocal collar fitted by [facility] on 4/2.  Fit is proper, all went without incident.  Please call [facility] with any questions, [phone].

## 2021-04-04 NOTE — PROGRESS NOTE ADULT - PROBLEM SELECTOR PLAN 4
- bilateral below knee carroll vein DVT  - high risk for progression as pt is quadriplegic.  - ivc filter - vascular surgery, call monday as pt currently does not want   - prior to discharge to rehab - repeat duplex - bilateral below knee carroll vein DVT  - high risk for progression as pt is quadriplegic.  - ivc filter - vascular surgery, call monday and discuss with pt regarding filter placement as high risk of clot progression. (pt currently refusing).   - prior to discharge to rehab - repeat duplex

## 2021-04-04 NOTE — PROGRESS NOTE ADULT - PROBLEM SELECTOR PLAN 2
- pt has poor inspiratory effort and unable to cough likely 2/2 spinal cord injury as above leading to diaphragm muscle weakness  - has not needed respiratory support since admission, on RA spo2 wnl but high risk for atelectasis given poor inspiratory effort.   - physical therapy on board. monitor spo2.

## 2021-04-04 NOTE — CHART NOTE - NSCHARTNOTEFT_GEN_A_CORE
please ensure patient has fitted C-Collar. No further nsgy intervention. Plan as previous, with outpt fu in 6-8 weeks for repeat MRI.    - Please obtain a fitted c collar. Please contact jaime carlos for a fitted c-collar: [phone]
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
## 2021-04-05 LAB
ANION GAP SERPL CALC-SCNC: 10 MMOL/L — SIGNIFICANT CHANGE UP (ref 5–17)
BUN SERPL-MCNC: 15 MG/DL — SIGNIFICANT CHANGE UP (ref 7–23)
CALCIUM SERPL-MCNC: 8.9 MG/DL — SIGNIFICANT CHANGE UP (ref 8.4–10.5)
CHLORIDE SERPL-SCNC: 103 MMOL/L — SIGNIFICANT CHANGE UP (ref 96–108)
CO2 SERPL-SCNC: 28 MMOL/L — SIGNIFICANT CHANGE UP (ref 22–31)
CREAT SERPL-MCNC: 0.74 MG/DL — SIGNIFICANT CHANGE UP (ref 0.5–1.3)
GLUCOSE SERPL-MCNC: 87 MG/DL — SIGNIFICANT CHANGE UP (ref 70–99)
HCT VFR BLD CALC: 41.1 % — SIGNIFICANT CHANGE UP (ref 34.5–45)
HCV AB S/CO SERPL IA: 0.1 S/CO — SIGNIFICANT CHANGE UP (ref 0–0.99)
HCV AB SERPL-IMP: SIGNIFICANT CHANGE UP
HGB BLD-MCNC: 13.3 G/DL — SIGNIFICANT CHANGE UP (ref 11.5–15.5)
MAGNESIUM SERPL-MCNC: 2 MG/DL — SIGNIFICANT CHANGE UP (ref 1.6–2.6)
MCHC RBC-ENTMCNC: 31 PG — SIGNIFICANT CHANGE UP (ref 27–34)
MCHC RBC-ENTMCNC: 32.4 GM/DL — SIGNIFICANT CHANGE UP (ref 32–36)
MCV RBC AUTO: 95.8 FL — SIGNIFICANT CHANGE UP (ref 80–100)
NRBC # BLD: 0 /100 WBCS — SIGNIFICANT CHANGE UP (ref 0–0)
PHOSPHATE SERPL-MCNC: 3.9 MG/DL — SIGNIFICANT CHANGE UP (ref 2.5–4.5)
PLATELET # BLD AUTO: 218 K/UL — SIGNIFICANT CHANGE UP (ref 150–400)
POTASSIUM SERPL-MCNC: 4.2 MMOL/L — SIGNIFICANT CHANGE UP (ref 3.5–5.3)
POTASSIUM SERPL-SCNC: 4.2 MMOL/L — SIGNIFICANT CHANGE UP (ref 3.5–5.3)
RBC # BLD: 4.29 M/UL — SIGNIFICANT CHANGE UP (ref 3.8–5.2)
RBC # FLD: 13 % — SIGNIFICANT CHANGE UP (ref 10.3–14.5)
SODIUM SERPL-SCNC: 141 MMOL/L — SIGNIFICANT CHANGE UP (ref 135–145)
WBC # BLD: 12.53 K/UL — HIGH (ref 3.8–10.5)
WBC # FLD AUTO: 12.53 K/UL — HIGH (ref 3.8–10.5)

## 2021-04-05 PROCEDURE — 93970 EXTREMITY STUDY: CPT | Mod: 26

## 2021-04-05 PROCEDURE — 99233 SBSQ HOSP IP/OBS HIGH 50: CPT | Mod: GC

## 2021-04-05 PROCEDURE — 99223 1ST HOSP IP/OBS HIGH 75: CPT

## 2021-04-05 RX ORDER — MULTIVIT WITH MIN/MFOLATE/K2 340-15/3 G
1 POWDER (GRAM) ORAL ONCE
Refills: 0 | Status: COMPLETED | OUTPATIENT
Start: 2021-04-05 | End: 2021-04-05

## 2021-04-05 RX ADMIN — ENOXAPARIN SODIUM 40 MILLIGRAM(S): 100 INJECTION SUBCUTANEOUS at 17:30

## 2021-04-05 RX ADMIN — Medication 5 MILLIGRAM(S): at 23:32

## 2021-04-05 RX ADMIN — Medication 5 MILLIGRAM(S): at 06:58

## 2021-04-05 RX ADMIN — Medication 81 MILLIGRAM(S): at 14:16

## 2021-04-05 RX ADMIN — Medication 1 BOTTLE: at 17:30

## 2021-04-05 RX ADMIN — OXYCODONE HYDROCHLORIDE 10 MILLIGRAM(S): 5 TABLET ORAL at 02:30

## 2021-04-05 RX ADMIN — OXYCODONE HYDROCHLORIDE 10 MILLIGRAM(S): 5 TABLET ORAL at 01:33

## 2021-04-05 RX ADMIN — POLYETHYLENE GLYCOL 3350 17 GRAM(S): 17 POWDER, FOR SOLUTION ORAL at 17:30

## 2021-04-05 NOTE — PROGRESS NOTE ADULT - PROBLEM SELECTOR PLAN 4
- bilateral below knee carroll vein DVT  - high risk for progression as pt is quadriplegic.  - ivc filter - vascular surgery, call monday and discuss with pt regarding filter placement as high risk of clot progression. (pt currently refusing).   - prior to discharge to rehab - repeat duplex - pt has poor inspiratory effort and unable to cough likely 2/2 spinal cord injury as above leading to diaphragm muscle weakness  - has not needed respiratory support since admission, on RA and SpO2 wnl but high risk for atelectasis given poor inspiratory effort.   - physical therapy on board. monitor spo2.

## 2021-04-05 NOTE — PROGRESS NOTE ADULT - PROBLEM SELECTOR PLAN 5
- pt has uterine prolapse, also cystocele   - guerrero in place as pt unable to urinate without it. gyn following. draining clear urine.  - urology assessed guerrero 4/3, no concern for obstruction/kink, rec belladonna suppository if bladder spams - ordered prn. - dx with MS; follow w Dr. Madrid at [facility], was told she is JUDY pos, has been off disease modifying therapy for at least 1 yr for concern of being immunosuppressed during COVID.    - neuro following - not recommending disease modifying therapy at this time and unlikely MS flair contributing to her presentation
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
## 2021-04-05 NOTE — PROGRESS NOTE ADULT - SUBJECTIVE AND OBJECTIVE BOX
***************************************************************  Nallely Smith MD, PGY1 Resident  Internal Medicine   pager: [phone]  ***************************************************************    JOSE LUIS RAM  55y  MRN: [id number]    Patient is a 55y old  Female who presents with a chief complaint of qudraplegia (04 Apr 2021 16:01)      Subjective: no events ON. Denies fever, CP, SOB, abn pain, N/V, dysuria. Tolerating diet.      REVIEW OF SYSTEMS:    CONSTITUTIONAL: No weakness, fevers or chills  EYES/ENT: No visual changes;  No vertigo or throat pain   NECK: No pain or stiffness  RESPIRATORY: No cough, wheezing, hemoptysis; No shortness of breath  CARDIOVASCULAR: No chest pain or palpitations  GASTROINTESTINAL: No abdominal or epigastric pain. No nausea, vomiting, or hematemesis; No diarrhea or constipation. No melena or hematochezia.  GENITOURINARY: No dysuria, frequency or hematuria  NEUROLOGICAL: No numbness or weakness  SKIN: No itching, rashes      Objective:    MEDICATIONS  (STANDING):  aspirin enteric coated 81 milliGRAM(s) Oral daily  enoxaparin Injectable 40 milliGRAM(s) SubCutaneous <User Schedule>  gabapentin 300 milliGRAM(s) Oral every 8 hours  lactated ringers. 1000 milliLiter(s) (75 mL/Hr) IV Continuous <Continuous>  polyethylene glycol 3350 17 Gram(s) Oral every 12 hours  senna 2 Tablet(s) Oral at bedtime    MEDICATIONS  (PRN):  acetaminophen   Tablet .. 650 milliGRAM(s) Oral every 6 hours PRN Temp greater or equal to 38C (100.4F), Mild Pain (1 - 3)  belladonna 16.2 mG/opium 30 mg Suppository 1 Suppository(s) Rectal daily PRN bladder spam  diazepam  Injectable 5 milliGRAM(s) IV Push every 6 hours PRN muscle spasm  ondansetron Injectable 4 milliGRAM(s) IV Push every 6 hours PRN Nausea and/or Vomiting  oxyCODONE    IR 5 milliGRAM(s) Oral every 6 hours PRN Moderate Pain (4 - 6)  oxyCODONE    IR 10 milliGRAM(s) Oral every 6 hours PRN Severe Pain (7 - 10)        Vitals: Vital Signs Last 24 Hrs  T(C): 36.7 (04-05-21 @ 05:58), Max: 37.3 (04-04-21 @ 22:13)  T(F): 98 (04-05-21 @ 05:58), Max: 99.1 (04-04-21 @ 22:13)  HR: 81 (04-05-21 @ 05:58) (72 - 90)  BP: 117/76 (04-05-21 @ 05:58) (114/73 - 147/85)  BP(mean): --  RR: 18 (04-05-21 @ 05:58) (18 - 18)  SpO2: 97% (04-05-21 @ 05:58) (95% - 98%)            I&O's Summary    04 Apr 2021 07:01  -  05 Apr 2021 07:00  --------------------------------------------------------  IN: 1530 mL / OUT: 1250 mL / NET: 280 mL        PHYSICAL EXAM:  GENERAL: NAD  HEAD:  Atraumatic, Normocephalic  EYES: EOMI, conjunctiva and sclera clear  CHEST/LUNG: Clear to percussion bilaterally; No rales, rhonchi, wheezing, or rubs  HEART: Regular rate and rhythm; No murmurs, rubs, or gallops  ABDOMEN: Soft, Nontender, Nondistended;   SKIN: No rashes or lesions  NERVOUS SYSTEM:  Alert & Oriented X3, no focal deficit    LABS:  04-05    141  |  103  |  15  ----------------------------<  87  4.2   |  28  |  0.74  04-04    142  |  107  |  14  ----------------------------<  86  4.1   |  26  |  0.72  04-02    142  |  111<H>  |  17  ----------------------------<  94  3.5   |  21<L>  |  0.54    Ca    8.9      05 Apr 2021 06:40  Ca    8.5      04 Apr 2021 07:17  Ca    7.2<L>      02 Apr 2021 23:15  Phos  3.9     04-05  Mg     2.0     04-05                                                13.3   12.53 )-----------( 218      ( 05 Apr 2021 06:40 )             41.1                         12.5   13.45 )-----------( 204      ( 04 Apr 2021 07:17 )             39.7                         10.8   9.53  )-----------( 191      ( 02 Apr 2021 23:15 )             34.3     CAPILLARY BLOOD GLUCOSE          RADIOLOGY & ADDITIONAL TESTS:    Imaging Personally Reviewed:  [x ] YES  [ ] NO    Consultants involved in case:   Consultant(s) Notes Reviewed:  [ x] YES  [ ] NO:   Care Discussed with Consultants/Other Providers [x ] YES  [ ] NO         ***************************************************************  Nallely Smith MD, PGY1 Resident  Internal Medicine   pager: [phone]  ***************************************************************    JOSE LUIS RAM  55y  MRN: [id number]    Patient is a 55y old  Female who presents with a chief complaint of qudraplegia (04 Apr 2021 16:01)      Subjective: Concern regarding not having peristalsis. Willing to try Mg Citrate today. Has lower extremities spastic pain. Denies fever, CP, SOB, abn pain, N/V, dysuria. Tolerating diet.      REVIEW OF SYSTEMS: as noted above. Otherwise negative.     Objective:    MEDICATIONS  (STANDING):  aspirin enteric coated 81 milliGRAM(s) Oral daily  enoxaparin Injectable 40 milliGRAM(s) SubCutaneous <User Schedule>  gabapentin 300 milliGRAM(s) Oral every 8 hours  lactated ringers. 1000 milliLiter(s) (75 mL/Hr) IV Continuous <Continuous>  polyethylene glycol 3350 17 Gram(s) Oral every 12 hours  senna 2 Tablet(s) Oral at bedtime    MEDICATIONS  (PRN):  acetaminophen   Tablet .. 650 milliGRAM(s) Oral every 6 hours PRN Temp greater or equal to 38C (100.4F), Mild Pain (1 - 3)  belladonna 16.2 mG/opium 30 mg Suppository 1 Suppository(s) Rectal daily PRN bladder spam  diazepam  Injectable 5 milliGRAM(s) IV Push every 6 hours PRN muscle spasm  ondansetron Injectable 4 milliGRAM(s) IV Push every 6 hours PRN Nausea and/or Vomiting  oxyCODONE    IR 5 milliGRAM(s) Oral every 6 hours PRN Moderate Pain (4 - 6)  oxyCODONE    IR 10 milliGRAM(s) Oral every 6 hours PRN Severe Pain (7 - 10)        Vitals: Vital Signs Last 24 Hrs  T(C): 36.7 (04-05-21 @ 05:58), Max: 37.3 (04-04-21 @ 22:13)  T(F): 98 (04-05-21 @ 05:58), Max: 99.1 (04-04-21 @ 22:13)  HR: 81 (04-05-21 @ 05:58) (72 - 90)  BP: 117/76 (04-05-21 @ 05:58) (114/73 - 147/85)  BP(mean): --  RR: 18 (04-05-21 @ 05:58) (18 - 18)  SpO2: 97% (04-05-21 @ 05:58) (95% - 98%)            I&O's Summary    04 Apr 2021 07:01  -  05 Apr 2021 07:00  --------------------------------------------------------  IN: 1530 mL / OUT: 1250 mL / NET: 280 mL        PHYSICAL EXAM:  GENERAL: 45 deg up in bed, C- collar in place  HEAD: Atraumatic, Normocephalic  NECK: in cervical brace  CHEST/LUNG: clear to auscultation bilaterally anteriorly   HEART: regular rate and rhythm, no murmurs  ABDOMEN: pt does maintain some sensation, mostly feels pins and needles with abdominal palpation  EXTREMITIES: no edema on bilateral LE or UE, no sensation to touch. warm extremities.   NEUROLOGY: A&O x3, quadriplegia, some sensation to touch. EOMI, able to raise eyebrows. speech coherent.     LABS:  04-05    141  |  103  |  15  ----------------------------<  87  4.2   |  28  |  0.74  04-04    142  |  107  |  14  ----------------------------<  86  4.1   |  26  |  0.72  04-02    142  |  111<H>  |  17  ----------------------------<  94  3.5   |  21<L>  |  0.54    Ca    8.9      05 Apr 2021 06:40  Ca    8.5      04 Apr 2021 07:17  Ca    7.2<L>      02 Apr 2021 23:15  Phos  3.9     04-05  Mg     2.0     04-05                                                13.3   12.53 )-----------( 218      ( 05 Apr 2021 06:40 )             41.1                         12.5   13.45 )-----------( 204      ( 04 Apr 2021 07:17 )             39.7                         10.8   9.53  )-----------( 191      ( 02 Apr 2021 23:15 )             34.3     CAPILLARY BLOOD GLUCOSE          RADIOLOGY & ADDITIONAL TESTS:    Imaging Personally Reviewed:  [x ] YES  [ ] NO    Consultants involved in case:   Consultant(s) Notes Reviewed:  [ x] YES  [ ] NO:   Care Discussed with Consultants/Other Providers [x ] YES  [ ] NO
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
## 2021-04-05 NOTE — SWALLOW FEES ASSESSMENT ADULT - DIAGNOSTIC IMPRESSIONS
Chart reviewed and case d/w pt who is currently requesting deferral of FEES exam. Rationale for objective testing as well as risks/complications associated with laryngeal penetration/aspiration (including respiratory distress/ PNA/death) reviewed with pt. Team to further review POC with pt and determine if diet to be advanced (if in accordance with pt's wishes). This service will remain available. Please see flowsheet #2 for progress in therapy/follow up.

## 2021-04-05 NOTE — PROGRESS NOTE ADULT - PROBLEM SELECTOR PLAN 2
- pt has poor inspiratory effort and unable to cough likely 2/2 spinal cord injury as above leading to diaphragm muscle weakness  - has not needed respiratory support since admission, on RA spo2 wnl but high risk for atelectasis given poor inspiratory effort.   - physical therapy on board. monitor spo2. - B/l below knee carroll vein DVT  - High risk for progression as pt is quadriplegic.  - Consulted vascular cardiology for possible IVC filter  - Will repeat duplex

## 2021-04-05 NOTE — PROGRESS NOTE ADULT - PROBLEM SELECTOR PLAN 6
- pt has had to strain very hard and manually disimpact at home for bm recently due to worsening prolapse  - last bm 4/1  - enema ordered today as pt would like to try but then refusing. does not have any sensation in abdomen, will need close monitoring of bms to ensure she does not develop sbo as she is on opioids and immobilized. - pt has uterine prolapse, also cystocele   - Uro and gyn saw pt  - Sanchez was removed 4/4 due to pain  - Will monitor UOP with primafit

## 2021-04-05 NOTE — CHART NOTE - NSCHARTNOTEFT_GEN_A_CORE
Chart reviewed. Palliative following for goals of care. Patient made herself DNR/I, MOLST completed. HCP established. Patient's , Tito is HCP. Patient on PO dysphagia diet. With goals established and hcp form complete, will sign off. Please reconsult if needed.     Yessi Dukes,   Hospice and Palliative Care Fellow   [facility] Pager [phone]  [facility] Pager [phone]

## 2021-04-05 NOTE — CONSULT NOTE ADULT - ASSESSMENT
1. b/l soleal DVT  2. 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 c/w Lovenox 40mg QD. Repeat duplex to monitor for progression of clot. If remains stable and below the knee, would consider management with Lovenox 40mg QD and outpatient follow-up. However if evidence of further progression, will need to discuss with NSGY if pt can be on full a/c vs if she will need an IVC filter.

## 2021-04-05 NOTE — PROGRESS NOTE ADULT - PROBLEM SELECTOR PLAN 8
- on lovenox 40mg QD. ivc filter discussion with vascular and pt monday. repeat duplex prior to d/c to rehab.    - diet - dysphagia 1. Also on mIVF 75 cc/hr. pt drinking thin liquids and understands aspiration risk - repeat swallow eval pending - DVT ppx: on lovenox 40mg QD  - Diet: thin liquids and puree. Discussed risks of aspiration given her neurological injury from vertebral artery dissection. Pt refused FEES and preferred thin liquids and puree  - Dispo: pending PM&R recs, clinical improvement

## 2021-04-06 LAB
ANION GAP SERPL CALC-SCNC: 12 MMOL/L — SIGNIFICANT CHANGE UP (ref 5–17)
BUN SERPL-MCNC: 29 MG/DL — HIGH (ref 7–23)
CALCIUM SERPL-MCNC: 8.2 MG/DL — LOW (ref 8.4–10.5)
CHLORIDE SERPL-SCNC: 102 MMOL/L — SIGNIFICANT CHANGE UP (ref 96–108)
CO2 SERPL-SCNC: 27 MMOL/L — SIGNIFICANT CHANGE UP (ref 22–31)
CREAT SERPL-MCNC: 0.81 MG/DL — SIGNIFICANT CHANGE UP (ref 0.5–1.3)
GLUCOSE SERPL-MCNC: 109 MG/DL — HIGH (ref 70–99)
HCT VFR BLD CALC: 39.9 % — SIGNIFICANT CHANGE UP (ref 34.5–45)
HGB BLD-MCNC: 12.8 G/DL — SIGNIFICANT CHANGE UP (ref 11.5–15.5)
MAGNESIUM SERPL-MCNC: 2.4 MG/DL — SIGNIFICANT CHANGE UP (ref 1.6–2.6)
MCHC RBC-ENTMCNC: 31.1 PG — SIGNIFICANT CHANGE UP (ref 27–34)
MCHC RBC-ENTMCNC: 32.1 GM/DL — SIGNIFICANT CHANGE UP (ref 32–36)
MCV RBC AUTO: 97.1 FL — SIGNIFICANT CHANGE UP (ref 80–100)
NRBC # BLD: 0 /100 WBCS — SIGNIFICANT CHANGE UP (ref 0–0)
PHOSPHATE SERPL-MCNC: 4.3 MG/DL — SIGNIFICANT CHANGE UP (ref 2.5–4.5)
PLATELET # BLD AUTO: 228 K/UL — SIGNIFICANT CHANGE UP (ref 150–400)
POTASSIUM SERPL-MCNC: 4 MMOL/L — SIGNIFICANT CHANGE UP (ref 3.5–5.3)
POTASSIUM SERPL-SCNC: 4 MMOL/L — SIGNIFICANT CHANGE UP (ref 3.5–5.3)
RBC # BLD: 4.11 M/UL — SIGNIFICANT CHANGE UP (ref 3.8–5.2)
RBC # FLD: 13 % — SIGNIFICANT CHANGE UP (ref 10.3–14.5)
SODIUM SERPL-SCNC: 141 MMOL/L — SIGNIFICANT CHANGE UP (ref 135–145)
WBC # BLD: 10.89 K/UL — HIGH (ref 3.8–10.5)
WBC # FLD AUTO: 10.89 K/UL — HIGH (ref 3.8–10.5)

## 2021-04-06 PROCEDURE — 99232 SBSQ HOSP IP/OBS MODERATE 35: CPT

## 2021-04-06 PROCEDURE — 99233 SBSQ HOSP IP/OBS HIGH 50: CPT | Mod: GC

## 2021-04-06 PROCEDURE — 99223 1ST HOSP IP/OBS HIGH 75: CPT | Mod: GC

## 2021-04-06 RX ORDER — DIAZEPAM 5 MG
5 TABLET ORAL EVERY 6 HOURS
Refills: 0 | Status: DISCONTINUED | OUTPATIENT
Start: 2021-04-06 | End: 2021-04-07

## 2021-04-06 RX ORDER — DIAZEPAM 5 MG
5 TABLET ORAL EVERY 6 HOURS
Refills: 0 | Status: DISCONTINUED | OUTPATIENT
Start: 2021-04-06 | End: 2021-04-06

## 2021-04-06 RX ORDER — SODIUM CHLORIDE 9 MG/ML
500 INJECTION, SOLUTION INTRAVENOUS
Refills: 0 | Status: DISCONTINUED | OUTPATIENT
Start: 2021-04-06 | End: 2021-04-06

## 2021-04-06 RX ORDER — ENOXAPARIN SODIUM 100 MG/ML
30 INJECTION SUBCUTANEOUS
Refills: 0 | Status: DISCONTINUED | OUTPATIENT
Start: 2021-04-06 | End: 2021-04-12

## 2021-04-06 RX ORDER — SODIUM CHLORIDE 9 MG/ML
1000 INJECTION, SOLUTION INTRAVENOUS
Refills: 0 | Status: DISCONTINUED | OUTPATIENT
Start: 2021-04-06 | End: 2021-04-07

## 2021-04-06 RX ADMIN — OXYCODONE HYDROCHLORIDE 10 MILLIGRAM(S): 5 TABLET ORAL at 22:07

## 2021-04-06 RX ADMIN — SODIUM CHLORIDE 25 MILLILITER(S): 9 INJECTION, SOLUTION INTRAVENOUS at 17:15

## 2021-04-06 RX ADMIN — OXYCODONE HYDROCHLORIDE 10 MILLIGRAM(S): 5 TABLET ORAL at 01:05

## 2021-04-06 RX ADMIN — Medication 650 MILLIGRAM(S): at 12:33

## 2021-04-06 RX ADMIN — ENOXAPARIN SODIUM 30 MILLIGRAM(S): 100 INJECTION SUBCUTANEOUS at 17:19

## 2021-04-06 RX ADMIN — OXYCODONE HYDROCHLORIDE 10 MILLIGRAM(S): 5 TABLET ORAL at 21:37

## 2021-04-06 RX ADMIN — Medication 81 MILLIGRAM(S): at 12:33

## 2021-04-06 RX ADMIN — Medication 10 MILLIGRAM(S): at 17:15

## 2021-04-06 RX ADMIN — Medication 5 MILLIGRAM(S): at 07:41

## 2021-04-06 RX ADMIN — Medication 650 MILLIGRAM(S): at 14:40

## 2021-04-06 RX ADMIN — OXYCODONE HYDROCHLORIDE 10 MILLIGRAM(S): 5 TABLET ORAL at 01:35

## 2021-04-06 RX ADMIN — POLYETHYLENE GLYCOL 3350 17 GRAM(S): 17 POWDER, FOR SOLUTION ORAL at 17:16

## 2021-04-06 NOTE — PROGRESS NOTE ADULT - PROBLEM SELECTOR PLAN 4
- pt has poor inspiratory effort and unable to cough likely 2/2 spinal cord injury as above leading to diaphragm muscle weakness  - has not needed respiratory support since admission, on RA and SpO2 wnl but high risk for atelectasis given poor inspiratory effort.   - physical therapy on board. monitor spo2.

## 2021-04-06 NOTE — PROGRESS NOTE ADULT - SUBJECTIVE AND OBJECTIVE BOX
***************************************************************  Nallely Smith MD, PGY1 Resident  Internal Medicine   pager: [phone]  ***************************************************************    JOSE LUIS RAM  55y  MRN: [id number]    Patient is a 55y old  Female who presents with a chief complaint of weakness (06 Apr 2021 08:05)      Subjective: no events ON. Feels drowsy this AM after Valium. Willing to try enema today. Denies fever, CP, SOB, abn pain, N/V, dysuria. Tolerating diet.      REVIEW OF SYSTEMS: as noted above. Otherwise negative.      Objective:    MEDICATIONS  (STANDING):  aspirin enteric coated 81 milliGRAM(s) Oral daily  enoxaparin Injectable 40 milliGRAM(s) SubCutaneous <User Schedule>  gabapentin 300 milliGRAM(s) Oral every 8 hours  polyethylene glycol 3350 17 Gram(s) Oral every 12 hours  senna 2 Tablet(s) Oral at bedtime    MEDICATIONS  (PRN):  acetaminophen   Tablet .. 650 milliGRAM(s) Oral every 6 hours PRN Temp greater or equal to 38C (100.4F), Mild Pain (1 - 3)  belladonna 16.2 mG/opium 30 mg Suppository 1 Suppository(s) Rectal daily PRN bladder spam  diazepam  Injectable 5 milliGRAM(s) IV Push every 6 hours PRN muscle spasm  ondansetron Injectable 4 milliGRAM(s) IV Push every 6 hours PRN Nausea and/or Vomiting  oxyCODONE    IR 5 milliGRAM(s) Oral every 6 hours PRN Moderate Pain (4 - 6)  oxyCODONE    IR 10 milliGRAM(s) Oral every 6 hours PRN Severe Pain (7 - 10)        Vitals: Vital Signs Last 24 Hrs  T(C): 36.6 (04-06-21 @ 06:48), Max: 36.8 (04-05-21 @ 10:00)  T(F): 97.8 (04-06-21 @ 06:48), Max: 98.3 (04-05-21 @ 10:00)  HR: 90 (04-06-21 @ 06:48) (60 - 95)  BP: 122/78 (04-06-21 @ 06:48) (101/67 - 156/69)  BP(mean): --  RR: 18 (04-06-21 @ 06:48) (18 - 20)  SpO2: 96% (04-06-21 @ 06:48) (95% - 97%)            I&O's Summary    05 Apr 2021 07:01  -  06 Apr 2021 07:00  --------------------------------------------------------  IN: 240 mL / OUT: 200 mL / NET: 40 mL        PHYSICAL EXAM:  GENERAL: 45 deg up in bed, C- collar in place  HEAD: Atraumatic, Normocephalic  NECK: in cervical brace  CHEST/LUNG: clear to auscultation bilaterally anteriorly   HEART: regular rate and rhythm, no murmurs  ABDOMEN: pt does maintain some sensation, mostly feels pins and needles with abdominal palpation  EXTREMITIES: no edema on bilateral LE or UE, no sensation to touch. warm extremities.   NEUROLOGY: A&O x3, quadriplegia, some sensation to touch. EOMI, able to raise eyebrows. speech coherent.     LABS:  04-05    141  |  103  |  15  ----------------------------<  87  4.2   |  28  |  0.74  04-04    142  |  107  |  14  ----------------------------<  86  4.1   |  26  |  0.72    Ca    8.9      05 Apr 2021 06:40  Ca    8.5      04 Apr 2021 07:17  Phos  3.9     04-05  Mg     2.0     04-05                                                13.3   12.53 )-----------( 218      ( 05 Apr 2021 06:40 )             41.1                         12.5   13.45 )-----------( 204      ( 04 Apr 2021 07:17 )             39.7     CAPILLARY BLOOD GLUCOSE          RADIOLOGY & ADDITIONAL TESTS:    Imaging Personally Reviewed:  [x ] YES  [ ] NO    Consultants involved in case:   Consultant(s) Notes Reviewed:  [ x] YES  [ ] NO:   Care Discussed with Consultants/Other Providers [x ] YES  [ ] NO        < from: [facility] Duplex Lower Ext Vein Scan, Korin (04.05.21 @ 16:01) >  IMPRESSION: There is persistent right and left soleal vein DVT without proximal propagation.    < end of copied text >

## 2021-04-06 NOTE — PROGRESS NOTE ADULT - PROBLEM SELECTOR PLAN 3
- pt has had to strain very hard and manually disimpact at home for bm recently due to worsening prolapse  - last bm 4/1  - Will try Mg citrate since pt willing to try - pt has had to strain very hard and manually disimpact at home for bm recently due to worsening prolapse  - last bm 4/1. Tried mg citrate on 4/5 and no BM  - Will try tap water enema on 4/6

## 2021-04-06 NOTE — CONSULT NOTE ADULT - ATTENDING COMMENTS
Findings of asymptomatic, below the knee DVT    Favor:  1.  Repeat duplex today.  If shows stability, then continue lovenox 40mg daily   2.  If there is propagation to above the knee then need to discuss role of full dose a/c vs. IVC filter.  Will need input from Nsx re anticoagulation     Await duplex  Discussed with Dr. Reyes Galmer [phone]
patient seen as a trauma consultation  s/p fall as noted  on observation the patient was awake, alert, conversive  however, on exam not able to move arms or legs  I immediately reviewed the CT angiogram with radiology - there is a vertebral artery dissection but the pseudoaneurysm is small - unclear if this could lead to patients symptoms  I immediately conferred with E.D. attending and then brought neurosurgery for face to face discussion  the plan for admission to neurosurgery anticipating need for stat MRI.  Neurosurgery asking to hold on antiplatelet until MRI in case there is a contusion of the cord   Care handed off to neurosurgery team
55 year old woman with h/o MS, s/p vertebral artery dissection, spinal cord injury/contusion with quadriplegia, b/l LE DVT   Cervical collar, follow up MRI in 6-8 weeks   patient concerned about spinal stability, role of steroids, needs neurosurgery follow up  speech follow up regarding swallow eval, possible FEES, patient drinking thin liquids  bowel regimen with daily suppository, bladder scans, need to avoid retention   reposition every two hours, at risk for pressure injury at heels, sacrum, scalp  add melatonin for sleep  patient needs acute SCI inpatient rehabilitation, recommend [facility]

## 2021-04-06 NOTE — PROVIDER CONTACT NOTE (OTHER) - REASON
pt just had a small, mucous consistency light brown colored bowel movement. pt states she feels she needs to be disimpacted and that she feels she has hard stool stuck inside
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
## 2021-04-06 NOTE — PROVIDER CONTACT NOTE (OTHER) - REASON
per discussion with Dr. Smith, give suppository after giving the iv fluids; if suppository didn't work and pt doesn't have BM, give the enema. pt was holding off on enema today and stated "i'll do it later when my  is here". pt also doesn't want to be bladder scanned q6h since she is voiding in suction cannister via primafit. will bladder scan now per orders

## 2021-04-06 NOTE — CONSULT NOTE ADULT - CONSULT REQUESTED DATE/TIME
01-Apr-2021 23:05
02-Apr-2021 10:49
02-Apr-2021 17:01
02-Apr-2021 13:50
05-Apr-2021 11:28
06-Apr-2021 08:06
02-Apr-2021 14:17

## 2021-04-06 NOTE — PROGRESS NOTE ADULT - PROBLEM SELECTOR PLAN 8
- DVT ppx: on lovenox 40mg QD  - Diet: thin liquids and puree. Discussed risks of aspiration given her neurological injury from vertebral artery dissection. Pt refused FEES and preferred thin liquids and puree  - Dispo: pending PM&R recs, clinical improvement - DVT ppx: on lovenox 40mg QD  - Diet: Regular. Discussed risks of aspiration given her neurological injury from vertebral artery dissection. Pt refused FEES and preferred thin liquids and puree  - Dispo: pending PM&R recs, clinical improvement

## 2021-04-06 NOTE — PROGRESS NOTE ADULT - SUBJECTIVE AND OBJECTIVE BOX
Vascular Cardiology  Progress note  DIRECT SERVICE NUMBER: [phone]            EMAIL [email or web address]   OFFICE [phone]    CC: neck trauma      INTERVAL HISTORY: No events overnight. Pt denies any chest pain, sob, palpitations, LE edema, or skin changes.        Allergies    Allergy Status Unknown    Intolerances    	    MEDICATIONS:  aspirin enteric coated 81 milliGRAM(s) Oral daily  enoxaparin Injectable 40 milliGRAM(s) SubCutaneous <User Schedule>        acetaminophen   Tablet .. 650 milliGRAM(s) Oral every 6 hours PRN  belladonna 16.2 mG/opium 30 mg Suppository 1 Suppository(s) Rectal daily PRN  diazepam  Injectable 5 milliGRAM(s) IV Push every 6 hours PRN  gabapentin 300 milliGRAM(s) Oral every 8 hours  ondansetron Injectable 4 milliGRAM(s) IV Push every 6 hours PRN  oxyCODONE    IR 5 milliGRAM(s) Oral every 6 hours PRN  oxyCODONE    IR 10 milliGRAM(s) Oral every 6 hours PRN    polyethylene glycol 3350 17 Gram(s) Oral every 12 hours  senna 2 Tablet(s) Oral at bedtime          PAST MEDICAL & SURGICAL HISTORY:  Multiple sclerosis        FAMILY HISTORY: non-pertinent       SOCIAL HISTORY:  unchanged    REVIEW OF SYSTEMS:  CONSTITUTIONAL: No fevers or chills  EYES: No eye pain, visual disturbances, or discharge  ENT:  No difficulty hearing, tinnitus, vertigo; No sinus or throat pain  NECK: No pain or stiffness  RESPIRATORY:  No SOB  CARDIOVASCULAR:  No chest pain   GASTROINTESTINAL: No abdominal or epigastric pain. No nausea, vomiting, or hematemesis; No diarrhea or constipation. No melena or hematochezia.  GENITOURINARY: No dysuria, frequency, hematuria, or incontinence  NEUROLOGICAL: No headaches, memory loss, loss of strength, numbness, or tremors  SKIN: no rash  LYMPH Nodes: No enlarged glands  ENDOCRINE: No heat or cold intolerance; No hair loss  MUSCULOSKELETAL: No joint pain or swelling; No muscle, back, or extremity pain  HEME/LYMPH: No easy bruising, or bleeding gums  ALLERGY AND IMMUNOLOGIC: No hives or eczema	    [ x] All others negative	  [ ] Unable to obtain    PHYSICAL EXAM:  T(C): 36.6 (04-06-21 @ 06:48), Max: 36.8 (04-05-21 @ 10:00)  HR: 90 (04-06-21 @ 06:48) (60 - 95)  BP: 122/78 (04-06-21 @ 06:48) (101/67 - 156/69)  RR: 18 (04-06-21 @ 06:48) (18 - 20)  SpO2: 96% (04-06-21 @ 06:48) (95% - 97%)  Wt(kg): --  I&O's Summary    05 Apr 2021 07:01  -  06 Apr 2021 07:00  --------------------------------------------------------  IN: 240 mL / OUT: 200 mL / NET: 40 mL    Appearance: NAD  HEENT:   Normal oral mucosa,  Lymphatic: No lymphadenopathy  Cardiovascular:  S1, S2, RRR  Respiratory: CTAB  Psychiatry:  AAO x 3   Gastrointestinal:  Soft, Non-tender, + BS	  Skin: No rashes, No ecchymoses, No cyanosis	  Neurologic: Loss of sensory and motor functions below the neck  Extremities:  No edema or skin changes    Vascular Pulse Exam:  Right DP: [x]palpable []non-palpable []audible      Left DP :   [x]palpable []non-palpable []audible  Right PT: [x]palpable [] non-palpable []audible   Left PT:  [x] palpable [] non-palpable []audible       LABS:	 	    CBC Full  -  ( 05 Apr 2021 06:40 )  WBC Count : 12.53 K/uL  Hemoglobin : 13.3 g/dL  Hematocrit : 41.1 %  Platelet Count - Automated : 218 K/uL  Mean Cell Volume : 95.8 fl  Mean Cell Hemoglobin : 31.0 pg  Mean Cell Hemoglobin Concentration : 32.4 gm/dL  Auto Neutrophil # : x  Auto Lymphocyte # : x  Auto Monocyte # : x  Auto Eosinophil # : x  Auto Basophil # : x  Auto Neutrophil % : x  Auto Lymphocyte % : x  Auto Monocyte % : x  Auto Eosinophil % : x  Auto Basophil % : x    04-05    141  |  103  |  15  ----------------------------<  87  4.2   |  28  |  0.74    Ca    8.9      05 Apr 2021 06:40  Phos  3.9     04-05  Mg     2.0     04-05

## 2021-04-06 NOTE — CHART NOTE - NSCHARTNOTEFT_GEN_A_CORE
Provider presented to the bedside to discuss the recommendations from PM&R team. Discussed the risk of autonomic dysreflexia     Plan:  - Will put routine bladder scan order. Pt may refuse  - Will try dulcolax suppository tonight and tap water enema as pt was willing at this time  - Will give small fluid to hydrate her Provider presented to the bedside to discuss the recommendations from PM&R team. Discussed the risk of autonomic dysreflexia. Pt is currently A&Ox3 and is refusing guerrero. Explained the need for bladder scan and straight cath for the concern for urinary retention. Pt at this time does not want the bladder to be scanned every 6 hours since she is having urinary output. Has hx of guerrero this admission and it caused her great bladder spasm and pain. She prefers to be off guerrero at this time. Also discussed Valium and risk of respiratory distress. Pt understands but     Plan:  - Will put routine bladder scan order. Pt may refuse  - Will try dulcolax suppository tonight and tap water enema as pt was willing at this time  - Will give small fluid to hydrate her Provider presented to the bedside to discuss the recommendations from PM&R team. Discussed the risk of autonomic dysreflexia. Pt is currently A&Ox3 and is refusing guerrero. Explained the need for bladder scan and straight cath for the concern for urinary retention. Pt at this time does not want the bladder to be scanned every 6 hours since she is having urinary output. Has hx of guerrero this admission and it caused her great bladder spasm and pain. She prefers to be off guerrero at this time. Also discussed Valium and risk of respiratory depression. Pt understands the risk but still wants to keep. 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 PM&R recommended gabapentine but pt felt light headed with the medication so she is not willing to try at this time.    Plan:  - Will put routine bladder scan order. Pt may refuse but will keep the order as routine  - Will try dulcolax suppository tonight and tap water enema as pt was willing at this time  - Will give small fluid to hydrate her as per pt has low fluid intake  - Will keep Valium per pt wish Provider presented to the bedside to discuss the recommendations from PM&R team. Discussed the risk of autonomic dysreflexia. Pt is currently A&Ox3 and is refusing guerrero. Explained the need for bladder scan and straight cath for the concern for urinary retention. Explained smallest pain or discomfort can cause autonomic dysreflexia and cause possible death. Pt at this time does not want the bladder to be scanned every 6 hours since she is having urinary output. Has hx of guerrero this admission and it caused her great bladder spasm and pain. She prefers to be off guerrero at this time. Also discussed Valium and risk of respiratory depression. Pt understands the risk but still wants to keep. 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 PM&R recommended gabapentine but pt felt light headed with the medication so she is not willing to try at this time.    Plan:  - Will put routine bladder scan order. Pt may refuse but will keep the order as routine  - Will try dulcolax suppository tonight and tap water enema as pt was willing at this time  - Will give small fluid to hydrate her as per pt has low fluid intake  - Will keep Valium per pt wish Provider presented to the bedside to discuss the recommendations from PM&R team. Discussed the risk of autonomic dysreflexia. Pt is currently A&Ox3 and is refusing guerrero. Explained the need for bladder scan and straight cath for the concern for urinary retention. Explained smallest pain or discomfort can cause autonomic dysreflexia and cause possible death. Pt at this time does not want the bladder to be scanned every 6 hours since she is having urinary output. Has hx of guerrero this admission and it caused her great bladder spasm and pain so was removed. She prefers to be off guerrero at this time. Also discussed Valium and risk of respiratory depression. Pt understands the risk but still wants to keep. 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 PM&R recommended gabapentine but pt felt light headed with the medication so she is not willing to try at this time.    Plan:  - Will put routine bladder scan order. Pt may refuse but will keep the order as routine  - Will try dulcolax suppository tonight and tap water enema as pt was willing at this time  - Will give small fluid to hydrate her as per pt has low fluid intake  - Will keep Valium per pt wish Provider presented to the bedside to discuss the recommendations from PM&R team. Discussed the risk of autonomic dysreflexia. Pt is currently A&Ox3 and is refusing guerrero. Explained the need for bladder scan and straight cath for the concern for urinary retention. Explained smallest pain or discomfort can cause autonomic dysreflexia and cause possible death. Pt at this time does not want the bladder to be scanned every 6 hours since she is having urinary output. Has hx of guerrero this admission and it caused her great bladder spasm and pain so was removed. She prefers to be off guerrero at this time. Also discussed Valium and risk of respiratory depression. Pt understands the risk but still wants to keep Valium as is. 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 PM&R recommended gabapentine but pt felt light headed with the medication so she is not willing to try at this time.    Plan:  - Will put routine bladder scan order. Pt may refuse but will keep the order as routine  - Will try dulcolax suppository tonight and tap water enema as pt was willing at this time  - Will give small fluid to hydrate her as per pt has low fluid intake  - Will keep Valium per pt wish Provider presented to the bedside to discuss the recommendations from PM&R team. Discussed the risk of autonomic dysreflexia. Pt is currently A&Ox3 and is refusing guerrero. Explained the need for bladder scan and straight cath for the concern for urinary retention. Explained smallest pain or discomfort can cause autonomic dysreflexia and cause possible death. Pt at this time does not want the bladder to be scanned every 6 hours since she is having urinary output. Has hx of guerrero this admission and it caused her great bladder spasm and pain so was removed. She prefers to be off guerrero at this time. Also discussed Valium and risk of respiratory depression. Pt understands the risk but still wants to keep Valium as is. 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 PM&R recommended gabapentine but pt felt light headed with the medication so she is not willing to try at this time.     Plan:  - Will put routine bladder scan order. Pt may refuse but will keep the order as routine  - Will continue primafit for now for urinary output monitoring. Advised pt to report to the nursing staff if feels worsening abdominal pressure  - Will try dulcolax suppository tonight and tap water enema as pt was willing at this time  - Will give small fluid to hydrate her as per pt has low fluid intake  - Will keep Valium per pt wish
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
## 2021-04-06 NOTE — PROGRESS NOTE ADULT - PROBLEM SELECTOR PLAN 2
- B/l below knee carroll vein DVT  - High risk for progression as pt is quadriplegic.  - Consulted vascular cardiology for possible IVC filter  - Will repeat duplex - B/l below knee carroll vein DVT  - High risk for progression as pt is quadriplegic  - Consulted vascular cardiology for possible IVC filter. At this time, repeat [facility] duplex b/l LE showing stable soleal DVT. No indication for filter, continue lovenox  - Will repeat duplex 3 weeks after to see interval changes
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
## 2021-04-06 NOTE — PROGRESS NOTE ADULT - PROBLEM SELECTOR PLAN 6
- pt has uterine prolapse, also cystocele   - Uro and gyn saw pt  - Sanchez was removed 4/4 due to pain  - Will monitor UOP with primafit

## 2021-04-06 NOTE — PROGRESS NOTE ADULT - ATTENDING COMMENTS
patient is asking to reduce dose of lovenox to 30mg daily given her low body weight  this is reasonable  Reduce to 30mg daily   repeat duplex end of week    [place]

## 2021-04-06 NOTE — PROGRESS NOTE ADULT - ASSESSMENT
1. b/l soleal DVT  2. 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 Duplex with no progression of DVT. c/w Lovenox 40mg QD. No indication for IVC filter.       1. b/l soleal DVT  2. 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 Duplex with no progression of DVT. Per discussion with pt, she would prefer to be on Lovenox 30mg QD. No indication for IVC filter.

## 2021-04-06 NOTE — PROGRESS NOTE ADULT - PROBLEM SELECTOR PLAN 5
- dx with MS; follow w Dr. Madrid at [facility], was told she is JUDY pos, has been off disease modifying therapy for at least 1 yr for concern of being immunosuppressed during COVID.    - neuro following - not recommending disease modifying therapy at this time and unlikely MS flair contributing to her presentation
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
## 2021-04-07 LAB
COVID-19 SPIKE DOMAIN AB INTERP: NEGATIVE — SIGNIFICANT CHANGE UP
COVID-19 SPIKE DOMAIN ANTIBODY RESULT: 9.07 AU/ML — SIGNIFICANT CHANGE UP
SARS-COV-2 IGG+IGM SERPL QL IA: 9.07 AU/ML — SIGNIFICANT CHANGE UP
SARS-COV-2 IGG+IGM SERPL QL IA: NEGATIVE — SIGNIFICANT CHANGE UP

## 2021-04-07 PROCEDURE — 99233 SBSQ HOSP IP/OBS HIGH 50: CPT

## 2021-04-07 PROCEDURE — 99232 SBSQ HOSP IP/OBS MODERATE 35: CPT

## 2021-04-07 PROCEDURE — 99233 SBSQ HOSP IP/OBS HIGH 50: CPT | Mod: GC

## 2021-04-07 RX ORDER — SODIUM CHLORIDE 9 MG/ML
500 INJECTION INTRAMUSCULAR; INTRAVENOUS; SUBCUTANEOUS ONCE
Refills: 0 | Status: COMPLETED | OUTPATIENT
Start: 2021-04-07 | End: 2021-04-07

## 2021-04-07 RX ORDER — BACLOFEN 100 %
5 POWDER (GRAM) MISCELLANEOUS THREE TIMES A DAY
Refills: 0 | Status: DISCONTINUED | OUTPATIENT
Start: 2021-04-07 | End: 2021-04-11

## 2021-04-07 RX ORDER — SODIUM CHLORIDE 9 MG/ML
1000 INJECTION, SOLUTION INTRAVENOUS
Refills: 0 | Status: DISCONTINUED | OUTPATIENT
Start: 2021-04-07 | End: 2021-04-12

## 2021-04-07 RX ORDER — DIAZEPAM 5 MG
5 TABLET ORAL EVERY 6 HOURS
Refills: 0 | Status: DISCONTINUED | OUTPATIENT
Start: 2021-04-07 | End: 2021-04-07

## 2021-04-07 RX ADMIN — POLYETHYLENE GLYCOL 3350 17 GRAM(S): 17 POWDER, FOR SOLUTION ORAL at 17:11

## 2021-04-07 RX ADMIN — OXYCODONE HYDROCHLORIDE 10 MILLIGRAM(S): 5 TABLET ORAL at 23:00

## 2021-04-07 RX ADMIN — ENOXAPARIN SODIUM 30 MILLIGRAM(S): 100 INJECTION SUBCUTANEOUS at 17:11

## 2021-04-07 RX ADMIN — OXYCODONE HYDROCHLORIDE 5 MILLIGRAM(S): 5 TABLET ORAL at 03:08

## 2021-04-07 RX ADMIN — OXYCODONE HYDROCHLORIDE 10 MILLIGRAM(S): 5 TABLET ORAL at 22:09

## 2021-04-07 RX ADMIN — Medication 81 MILLIGRAM(S): at 14:41

## 2021-04-07 RX ADMIN — SODIUM CHLORIDE 250 MILLILITER(S): 9 INJECTION INTRAMUSCULAR; INTRAVENOUS; SUBCUTANEOUS at 14:48

## 2021-04-07 RX ADMIN — OXYCODONE HYDROCHLORIDE 5 MILLIGRAM(S): 5 TABLET ORAL at 03:38

## 2021-04-07 RX ADMIN — SODIUM CHLORIDE 50 MILLILITER(S): 9 INJECTION, SOLUTION INTRAVENOUS at 17:15
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
## 2021-04-07 NOTE — PROVIDER CONTACT NOTE (OTHER) - REASON
per pt and night RN, pt had a brown, soft bowel movement that came out last night and pt also had a bowel movement now. pt didn't want enema last night and refusing it now too (Dr. Smith at bedside aware)
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
## 2021-04-07 NOTE — PROGRESS NOTE ADULT - PROBLEM SELECTOR PLAN 3
- pt has had to strain very hard and manually disimpact at home for bm recently due to worsening prolapse  - last bm 4/1. Tried mg citrate on 4/5 and no BM  - Will try tap water enema on 4/6 - Had BM on 4/6 and 4/7  - Will continue suppository dulcolax

## 2021-04-07 NOTE — DIETITIAN INITIAL EVALUATION ADULT. - REASON
Unable to perform nutrition-focused physical exam at this time. However Pt with overt severe visual muscle mass depletion to clavicles, and Pt states that is not her baseline appearance.

## 2021-04-07 NOTE — PROGRESS NOTE ADULT - ASSESSMENT
1. b/l soleal DVT  2. 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 Duplex with no progression of DVT.   2,  Continue wiht Lovenox 30mg QD. No indication for IVC filter.  3.  Plan for repeat duplex of the lower extremities Friday for surveillance      S[place]aja [phone]

## 2021-04-07 NOTE — PROGRESS NOTE ADULT - PROBLEM SELECTOR PLAN 8
- DVT ppx: on lovenox 40mg QD  - Diet: thin liquids and puree. Discussed risks of aspiration given her neurological injury from vertebral artery dissection. Pt refused FEES and preferred thin liquids and puree  - Dispo: pending PM&R recs, clinical improvement - DVT ppx: on lovenox 40mg QD  - Diet: thin liquids and puree. Discussed risks of aspiration given her neurological injury from vertebral artery dissection. Pt refused FEES and preferred thin liquids and puree  - Dispo: pending placement in acute spinal cord injury rehab - DVT ppx: on lovenox 40mg QD  - Diet: Regular. Discussed risks of aspiration given her neurological injury from vertebral artery dissection. Pt refused FEES and preferred thin liquids and puree  - Dispo: pending placement in acute spinal cord injury rehab
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
## 2021-04-07 NOTE — DIETITIAN INITIAL EVALUATION ADULT. - OTHER INFO
Pt with full assistance at meals. Reports poor appetite/PO intake in-house; endorses completing ~50% of meals and x2 Ensure Enlive per day. Pt currently ordered for x3 Ensures, and requesting x4/day to optimize PO intake as she is mainly consuming liquid foods given her current position. RD offered to adjust current diet texture, Pt declined and stated that she prefers to make menu selections on her own. RD provided education on importance of maintaining calorie and protein intake and discussed nutritionally dense food options. RD obtained numerous food preferences, will honor as able.     Per SLP note today (4/7): "...refused FEES testing. Per medicine note, MD "discussed risks of aspiration given her neurological injury from vertebral artery dissection. Pt refused FEES and preferred thin liquids and puree." Pt now on regular texture diet. Given above, further dysphagia w/u unlikely to ."    Pt states she had been nauseous yesterday, but received Zofran and has no current complaints of nausea. RD discussed nutritional management of nausea with Pt; consuming small frequent meals as tolerated, cold foods, etc.  Last BM x1 today per nursing flowsheets.    Current dosing weight: 58 Kg (via bed-scale 4/6) *Current weight likely subject to bed-scale discrepancies and collar weight.  Pt reports UBW 47.3 Kg, though she believes she may have lost ~1.8 Kg "over the past few months" and recently weighed 45.5 Kg. Reports that she had weighed 56.8 Kg x1.5 years ago and has gradually lost ~11.4 Kg (20%) however "not really sure" the cause.  Weight Hx per HIE: 49.9 Kg (1/4/21), 56.2 Kg (3/13/20)  -- Per report, Pt with notable unintentional weight loss however not "clinically significant."    Adjusted BMI based on recent weight of 49.9 Kg (1/4/21): 18.3 Kg/m2
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
## 2021-04-07 NOTE — PROGRESS NOTE ADULT - PROBLEM SELECTOR PLAN 2
- B/l below knee carroll vein DVT  - High risk for progression as pt is quadriplegic  - Consulted vascular cardiology for possible IVC filter. At this time, repeat VA duplex b/l LE showing stable soleal DVT. No indication for IVC filter, continue lovenox  - Will repeat duplex 3 weeks after to see interval changes - B/l below knee carroll vein DVT  - High risk for progression as pt is quadriplegic  - Consulted vascular cardiology for possible IVC filter. At this time, repeat VA duplex b/l LE showing stable soleal DVT. No indication for IVC filter, continue lovenox  - Will repeat duplex 3 weeks after to see interval changes. Will hold for now as pt does not want

## 2021-04-07 NOTE — DIETITIAN INITIAL EVALUATION ADULT. - PHYSCIAL ASSESSMENT
Weight based on recent chart weight of 49.9 Kg (1/4/21) as current weight likely inaccurate secondary to bed-scale discrepancies and weight of collar    Skin:

## 2021-04-07 NOTE — DIETITIAN INITIAL EVALUATION ADULT. - ADD RECOMMEND
1. Continue Regular Diet as ordered 2. Recommend additional Ensure per Pt request, total of x4 Ensure Enlive per day (provides 350cal, 20Gm protein per 8oz serving) 3. Continue antiemetics as ordered 4. Adjustments to bowel regimen as appropriate 5. Continue to obtain food preferences, honor as able 6. Continue to provide nutritionally relevant education as appropriate 7. New malnutrition alert placed; will follow-up according to protocol 8. Monitor weight trends, PO intake, GI function, electrolytes, and skin integrity

## 2021-04-07 NOTE — PROGRESS NOTE ADULT - ATTENDING COMMENTS
My exam shows C2 level with likely an AIS C injury, though cannot be certain without exam of S4-5 segments. Discussed with patient and family multiple issues including phases of rehab, concern for resp status, skin, bowel, bladder, cardiovascular changes.

## 2021-04-07 NOTE — PROGRESS NOTE ADULT - SUBJECTIVE AND OBJECTIVE BOX
Vascular Cardiology  Progress note  DIRECT SERVICE NUMBER: [phone]            EMAIL [email or web address]   OFFICE [phone]    CC: neck trauma      INTERVAL HISTORY: No events overnight. Pt denies any chest pain, sob, palpitations, LE edema, or skin changes.        Allergies    Allergy Status Unknown    Intolerances    	     MEDICATIONS  (STANDING):  aspirin enteric coated 81 milliGRAM(s) Oral daily  enoxaparin Injectable 30 milliGRAM(s) SubCutaneous <User Schedule>  lactated ringers. 1000 milliLiter(s) (50 mL/Hr) IV Continuous <Continuous>  polyethylene glycol 3350 17 Gram(s) Oral every 12 hours  senna 2 Tablet(s) Oral at bedtime        PAST MEDICAL & SURGICAL HISTORY:  Multiple sclerosis        FAMILY HISTORY: non-pertinent       SOCIAL HISTORY:  unchanged    REVIEW OF SYSTEMS:  CONSTITUTIONAL: No fevers or chills  EYES: No eye pain, visual disturbances, or discharge  ENT:  No difficulty hearing, tinnitus, vertigo; No sinus or throat pain  NECK: No pain or stiffness  RESPIRATORY:  No SOB  CARDIOVASCULAR:  No chest pain   GASTROINTESTINAL: No abdominal or epigastric pain. No nausea, vomiting, or hematemesis; No diarrhea or constipation. No melena or hematochezia.  GENITOURINARY: No dysuria, frequency, hematuria, or incontinence  NEUROLOGICAL: No headaches, memory loss, loss of strength, numbness, or tremors  SKIN: no rash  LYMPH Nodes: No enlarged glands  ENDOCRINE: No heat or cold intolerance; No hair loss  MUSCULOSKELETAL: No joint pain or swelling; No muscle, back, or extremity pain  HEME/LYMPH: No easy bruising, or bleeding gums  ALLERGY AND IMMUNOLOGIC: No hives or eczema	    [ x] All others negative	  [ ] Unable to obtain     ICU Vital Signs Last 24 Hrs  T(C): 36.8 (07 Apr 2021 14:50), Max: 36.8 (06 Apr 2021 21:33)  T(F): 98.3 (07 Apr 2021 14:50), Max: 98.3 (06 Apr 2021 21:33)  HR: 85 (07 Apr 2021 14:50) (73 - 88)  BP: 119/77 (07 Apr 2021 14:50) (103/60 - 129/69)  BP(mean): --  ABP: --  ABP(mean): --  RR: 18 (07 Apr 2021 14:50) (18 - 18)  SpO2: 94% (07 Apr 2021 14:50) (94% - 100%)      Appearance: NAD  HEENT:   Normal oral mucosa,  Lymphatic: No lymphadenopathy  Cardiovascular:  S1, S2, RRR  Respiratory: CTAB  Psychiatry:  AAO x 3   Gastrointestinal:  Soft, Non-tender, + BS	  Skin: No rashes, No ecchymoses, No cyanosis	  Neurologic: Loss of sensory and motor functions below the neck  Extremities:  No edema or skin changes    Vascular Pulse Exam:  Right DP: [x]palpable []non-palpable []audible      Left DP :   [x]palpable []non-palpable []audible  Right PT: [x]palpable [] non-palpable []audible   Left PT:  [x] palpable [] non-palpable []audible                             12.8   10.89 )-----------( 228      ( 06 Apr 2021 16:20 )             39.9   04-06    141  |  102  |  29<H>  ----------------------------<  109<H>  4.0   |  27  |  0.81    Ca    8.2<L>      06 Apr 2021 16:20  Phos  4.3     04-06  Mg     2.4     04-06

## 2021-04-07 NOTE — PROGRESS NOTE ADULT - PROBLEM SELECTOR PLAN 5
- dx with MS; follow w Dr. Madrid at [facility], was told she is JUDY pos, has been off disease modifying therapy for at least 1 yr for concern of being immunosuppressed during COVID.    - neuro following - not recommending disease modifying therapy at this time and unlikely MS flair contributing to her presentation

## 2021-04-07 NOTE — PROVIDER CONTACT NOTE (OTHER) - REASON
pt refusing tap water enema, pt had small BM on night shift ,pt refuses gabapentin,pt refuses senna, PRN valium injectable not available in pharmacy , pt asking for valium

## 2021-04-07 NOTE — DIETITIAN INITIAL EVALUATION ADULT. - ORAL INTAKE PTA/DIET HISTORY
Pt visited at bedside who poor appetite/PO intake over the past 1.5 years, however slightly "increased" appetite/PO intake the past week PTA. Endorses consuming x2 meals/day and occasionally a small lunch. Per recall, Pt likely meeting </=75% estimated needs >/=1 month PTA. Pt denies taking any vitamins or minerals PTA. Confirms NKFA.

## 2021-04-07 NOTE — DIETITIAN INITIAL EVALUATION ADULT. - PERTINENT MEDS FT
MEDICATIONS  (STANDING):  aspirin enteric coated 81 milliGRAM(s) Oral daily  enoxaparin Injectable 30 milliGRAM(s) SubCutaneous <User Schedule>  lactated ringers. 1000 milliLiter(s) (50 mL/Hr) IV Continuous <Continuous>  polyethylene glycol 3350 17 Gram(s) Oral every 12 hours  senna 2 Tablet(s) Oral at bedtime    MEDICATIONS  (PRN):  acetaminophen   Tablet .. 650 milliGRAM(s) Oral every 6 hours PRN Temp greater or equal to 38C (100.4F), Mild Pain (1 - 3)  baclofen 5 milliGRAM(s) Oral three times a day PRN Muscle Spasm  belladonna 16.2 mG/opium 30 mg Suppository 1 Suppository(s) Rectal daily PRN bladder spam  bisacodyl Suppository 10 milliGRAM(s) Rectal daily PRN Constipation  ondansetron Injectable 4 milliGRAM(s) IV Push every 6 hours PRN Nausea and/or Vomiting  oxyCODONE    IR 5 milliGRAM(s) Oral every 6 hours PRN Moderate Pain (4 - 6)  oxyCODONE    IR 10 milliGRAM(s) Oral every 6 hours PRN Severe Pain (7 - 10)

## 2021-04-07 NOTE — DIETITIAN INITIAL EVALUATION ADULT. - OTHER CALCULATIONS
estimated nutrient needs based recent weight of 49.9 Kg (1/4/21) with considerations for malnutrition

## 2021-04-07 NOTE — DIETITIAN NUTRITION RISK NOTIFICATION - TREATMENT: THE FOLLOWING DIET HAS BEEN RECOMMENDED
Diet, Regular:   Supplement Feeding Modality:  Oral  Ensure Enlive Cans or Servings Per Day:  1       Frequency:  Three Times a day (04-05-21 @ 13:41) [Active]

## 2021-04-07 NOTE — PROGRESS NOTE ADULT - SUBJECTIVE AND OBJECTIVE BOX
***************************************************************  Nallely Smith MD, PGY1 Resident  Internal Medicine   pager: [phone]  ***************************************************************    JOSE LUIS RAM  55y  MRN: [id number]    Patient is a 55y old  Female who presents with a chief complaint of weakness (06 Apr 2021 08:05)      Subjective: no events ON. Denies fever, CP, SOB, abn pain, N/V, dysuria. Tolerating diet.      REVIEW OF SYSTEMS: as noted above. Otherwise negative      Objective:    MEDICATIONS  (STANDING):  aspirin enteric coated 81 milliGRAM(s) Oral daily  bisacodyl Suppository 10 milliGRAM(s) Rectal daily  enoxaparin Injectable 30 milliGRAM(s) SubCutaneous <User Schedule>  gabapentin 300 milliGRAM(s) Oral every 8 hours  lactated ringers. 1000 milliLiter(s) (25 mL/Hr) IV Continuous <Continuous>  polyethylene glycol 3350 17 Gram(s) Oral every 12 hours  senna 2 Tablet(s) Oral at bedtime    MEDICATIONS  (PRN):  acetaminophen   Tablet .. 650 milliGRAM(s) Oral every 6 hours PRN Temp greater or equal to 38C (100.4F), Mild Pain (1 - 3)  belladonna 16.2 mG/opium 30 mg Suppository 1 Suppository(s) Rectal daily PRN bladder spam  diazepam    Tablet 5 milliGRAM(s) Oral every 6 hours PRN muscle spasms  ondansetron Injectable 4 milliGRAM(s) IV Push every 6 hours PRN Nausea and/or Vomiting  oxyCODONE    IR 5 milliGRAM(s) Oral every 6 hours PRN Moderate Pain (4 - 6)  oxyCODONE    IR 10 milliGRAM(s) Oral every 6 hours PRN Severe Pain (7 - 10)        Vitals: Vital Signs Last 24 Hrs  T(C): 36.6 (04-07-21 @ 05:29), Max: 36.8 (04-06-21 @ 21:33)  T(F): 97.8 (04-07-21 @ 05:29), Max: 98.3 (04-06-21 @ 21:33)  HR: 81 (04-07-21 @ 05:29) (60 - 88)  BP: 103/64 (04-07-21 @ 05:29) (103/64 - 129/69)  BP(mean): --  RR: 18 (04-07-21 @ 05:29) (18 - 18)  SpO2: 100% (04-07-21 @ 05:29) (93% - 100%)            I&O's Summary    05 Apr 2021 07:01  -  06 Apr 2021 07:00  --------------------------------------------------------  IN: 240 mL / OUT: 200 mL / NET: 40 mL    06 Apr 2021 07:01  -  07 Apr 2021 06:54  --------------------------------------------------------  IN: 1380 mL / OUT: 450 mL / NET: 930 mL        PHYSICAL EXAM:  GENERAL: NAD  HEAD:  Atraumatic, Normocephalic  EYES: EOMI, conjunctiva and sclera clear  CHEST/LUNG: Clear to percussion bilaterally; No rales, rhonchi, wheezing, or rubs  HEART: Regular rate and rhythm; No murmurs, rubs, or gallops  ABDOMEN: Soft, Nontender, Nondistended;   SKIN: No rashes or lesions  NERVOUS SYSTEM:  Alert & Oriented X3, no focal deficit    LABS:  04-06    141  |  102  |  29<H>  ----------------------------<  109<H>  4.0   |  27  |  0.81  04-05    141  |  103  |  15  ----------------------------<  87  4.2   |  28  |  0.74  04-04    142  |  107  |  14  ----------------------------<  86  4.1   |  26  |  0.72    Ca    8.2<L>      06 Apr 2021 16:20  Ca    8.9      05 Apr 2021 06:40  Ca    8.5      04 Apr 2021 07:17  Phos  4.3     04-06  Mg     2.4     04-06                                                12.8   10.89 )-----------( 228      ( 06 Apr 2021 16:20 )             39.9                         13.3   12.53 )-----------( 218      ( 05 Apr 2021 06:40 )             41.1                         12.5   13.45 )-----------( 204      ( 04 Apr 2021 07:17 )             39.7     CAPILLARY BLOOD GLUCOSE          RADIOLOGY & ADDITIONAL TESTS:    Imaging Personally Reviewed:  [x ] YES  [ ] NO    Consultants involved in case:   Consultant(s) Notes Reviewed:  [ x] YES  [ ] NO:   Care Discussed with Consultants/Other Providers [x ] YES  [ ] NO         ***************************************************************  Nallely Smith MD, PGY1 Resident  Internal Medicine   pager: [phone]  ***************************************************************    JOSE LUIS RAM  55y  MRN: [id number]    Patient is a 55y old  Female who presents with a chief complaint of weakness (06 Apr 2021 08:05)      Subjective: Had BM on 4/6 evening and 4/7 AM. Denies fever, CP, SOB, abn pain, N/V, dysuria. Tolerating diet.      REVIEW OF SYSTEMS: as noted above. Otherwise negative      Objective:    MEDICATIONS  (STANDING):  aspirin enteric coated 81 milliGRAM(s) Oral daily  bisacodyl Suppository 10 milliGRAM(s) Rectal daily  enoxaparin Injectable 30 milliGRAM(s) SubCutaneous <User Schedule>  gabapentin 300 milliGRAM(s) Oral every 8 hours  lactated ringers. 1000 milliLiter(s) (25 mL/Hr) IV Continuous <Continuous>  polyethylene glycol 3350 17 Gram(s) Oral every 12 hours  senna 2 Tablet(s) Oral at bedtime    MEDICATIONS  (PRN):  acetaminophen   Tablet .. 650 milliGRAM(s) Oral every 6 hours PRN Temp greater or equal to 38C (100.4F), Mild Pain (1 - 3)  belladonna 16.2 mG/opium 30 mg Suppository 1 Suppository(s) Rectal daily PRN bladder spam  diazepam    Tablet 5 milliGRAM(s) Oral every 6 hours PRN muscle spasms  ondansetron Injectable 4 milliGRAM(s) IV Push every 6 hours PRN Nausea and/or Vomiting  oxyCODONE    IR 5 milliGRAM(s) Oral every 6 hours PRN Moderate Pain (4 - 6)  oxyCODONE    IR 10 milliGRAM(s) Oral every 6 hours PRN Severe Pain (7 - 10)        Vitals: Vital Signs Last 24 Hrs  T(C): 36.6 (04-07-21 @ 05:29), Max: 36.8 (04-06-21 @ 21:33)  T(F): 97.8 (04-07-21 @ 05:29), Max: 98.3 (04-06-21 @ 21:33)  HR: 81 (04-07-21 @ 05:29) (60 - 88)  BP: 103/64 (04-07-21 @ 05:29) (103/64 - 129/69)  BP(mean): --  RR: 18 (04-07-21 @ 05:29) (18 - 18)  SpO2: 100% (04-07-21 @ 05:29) (93% - 100%)            I&O's Summary    05 Apr 2021 07:01  -  06 Apr 2021 07:00  --------------------------------------------------------  IN: 240 mL / OUT: 200 mL / NET: 40 mL    06 Apr 2021 07:01  -  07 Apr 2021 06:54  --------------------------------------------------------  IN: 1380 mL / OUT: 450 mL / NET: 930 mL        PHYSICAL EXAM:  GENERAL: 45 deg up in bed, C- collar in place  HEAD: Atraumatic, Normocephalic  NECK: in cervical brace  CHEST/LUNG: clear to auscultation bilaterally anteriorly   HEART: regular rate and rhythm, no murmurs  ABDOMEN: pt does maintain some sensation, mostly feels pins and needles with abdominal palpation  EXTREMITIES: no edema on bilateral LE or UE, no sensation to touch. warm extremities.   NEUROLOGY: A&O x3, quadriplegia, some sensation to touch. EOMI, able to raise eyebrows. speech coherent.     LABS:  04-06    141  |  102  |  29<H>  ----------------------------<  109<H>  4.0   |  27  |  0.81  04-05    141  |  103  |  15  ----------------------------<  87  4.2   |  28  |  0.74  04-04    142  |  107  |  14  ----------------------------<  86  4.1   |  26  |  0.72    Ca    8.2<L>      06 Apr 2021 16:20  Ca    8.9      05 Apr 2021 06:40  Ca    8.5      04 Apr 2021 07:17  Phos  4.3     04-06  Mg     2.4     04-06                                                12.8   10.89 )-----------( 228      ( 06 Apr 2021 16:20 )             39.9                         13.3   12.53 )-----------( 218      ( 05 Apr 2021 06:40 )             41.1                         12.5   13.45 )-----------( 204      ( 04 Apr 2021 07:17 )             39.7     CAPILLARY BLOOD GLUCOSE          RADIOLOGY & ADDITIONAL TESTS:    Imaging Personally Reviewed:  [x ] YES  [ ] NO    Consultants involved in case:   Consultant(s) Notes Reviewed:  [ x] YES  [ ] NO:   Care Discussed with Consultants/Other Providers [x ] YES  [ ] NO

## 2021-04-08 LAB — SARS-COV-2 RNA SPEC QL NAA+PROBE: SIGNIFICANT CHANGE UP

## 2021-04-08 PROCEDURE — 99232 SBSQ HOSP IP/OBS MODERATE 35: CPT

## 2021-04-08 PROCEDURE — 99232 SBSQ HOSP IP/OBS MODERATE 35: CPT | Mod: GC

## 2021-04-08 RX ORDER — JNJ-78436735 50000000000 [PFU]/.5ML
0.5 SUSPENSION INTRAMUSCULAR ONCE
Refills: 0 | Status: DISCONTINUED | OUTPATIENT
Start: 2021-04-08 | End: 2021-04-08

## 2021-04-08 RX ORDER — DIAZEPAM 5 MG
5 TABLET ORAL ONCE
Refills: 0 | Status: DISCONTINUED | OUTPATIENT
Start: 2021-04-08 | End: 2021-04-08

## 2021-04-08 RX ORDER — CYCLOBENZAPRINE HYDROCHLORIDE 10 MG/1
5 TABLET, FILM COATED ORAL ONCE
Refills: 0 | Status: COMPLETED | OUTPATIENT
Start: 2021-04-08 | End: 2021-04-08

## 2021-04-08 RX ADMIN — ENOXAPARIN SODIUM 30 MILLIGRAM(S): 100 INJECTION SUBCUTANEOUS at 17:28

## 2021-04-08 RX ADMIN — OXYCODONE HYDROCHLORIDE 5 MILLIGRAM(S): 5 TABLET ORAL at 11:43

## 2021-04-08 RX ADMIN — Medication 5 MILLIGRAM(S): at 04:48

## 2021-04-08 RX ADMIN — OXYCODONE HYDROCHLORIDE 5 MILLIGRAM(S): 5 TABLET ORAL at 12:15

## 2021-04-08 RX ADMIN — SODIUM CHLORIDE 50 MILLILITER(S): 9 INJECTION, SOLUTION INTRAVENOUS at 04:48

## 2021-04-08 RX ADMIN — Medication 81 MILLIGRAM(S): at 12:39

## 2021-04-08 NOTE — PROGRESS NOTE ADULT - ASSESSMENT
1. b/l soleal DVT  2. 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  Duplex with no progression of DVT.   2,  Continue with Lovenox 30mg QD. No indication for IVC filter.  3.  Plan for repeat duplex of the lower extremities tomorrow for surveillance      Sailaja [phone]

## 2021-04-08 NOTE — PROGRESS NOTE ADULT - PROBLEM SELECTOR PLAN 5
- dx with MS; follow w Dr. Madrid at [facility], was told she is JUDY pos, has been off disease modifying therapy for at least 1 yr for concern of being immunosuppressed during COVID.    - neuro following - not recommending disease modifying therapy at this time and unlikely MS flair contributing to her presentation

## 2021-04-08 NOTE — PROGRESS NOTE ADULT - PROBLEM SELECTOR PLAN 8
- DVT ppx: on lovenox 40mg QD  - Diet: Regular. Discussed risks of aspiration given her neurological injury from vertebral artery dissection. Pt refused FEES and preferred thin liquids and puree  - Dispo: pending placement in acute spinal cord injury rehab

## 2021-04-08 NOTE — PROVIDER CONTACT NOTE (MEDICATION) - SITUATION
Anesthesia PreOp Note    HPI:     Tanisha Zarco is a 77year old female who presents for preoperative consultation requested by: Malika Hill MD    Date of Surgery: 6/17/2017 - 6/18/2017    Procedure(s):  KNEE TOTAL REVISION  Indication: Mechanical loos pt refusing covid 19 J&J vaccine at this time. pt states she will take it when she is feeling a little better Class: Chronic         Date Noted: 06/29/2012      Laboratory examination ordered as part of a routine general medical examination         Class: Chronic         Date Noted: 06/29/2012      Osteoporosis, unspecified         Class: Chronic         Da Cyclobenzaprine HCl (FLEXERIL) 10 MG Oral Tab Take 1 tablet (10 mg total) by mouth 3 (three) times daily as needed for Muscle spasms.  Disp: 90 tablet Rfl: 0 Past Week at Unknown time   CLONIDINE HCL 0.1 MG Oral Tab TAKE 1 TABLET EVERY EVENING (MD VISIT REQ [MAR Hold] Vancomycin HCl (VANCOCIN) 1,250 mg in sodium chloride 0.9 % 500 mL IVPB 15 mg/kg Intravenous Q12H CoHarini MD Last Rate: 333.3 mL/hr at 06/18/17 0508 1,250 mg at 06/18/17 8225   aspirin EC EC tab 325 mg 325 mg Oral Daily CoKlaus MD Social History Narrative       Available pre-op labs reviewed.     Lab Results  Component Value Date   WBC 5.6 06/18/2017   RBC 3.34* 06/18/2017   HGB 9.4* 06/18/2017   HCT 28.1* 06/18/2017   MCV 84.0 06/18/2017   MCH 28.3 06/18/2017   MCHC 33.7 06/18/2017

## 2021-04-08 NOTE — PROGRESS NOTE ADULT - SUBJECTIVE AND OBJECTIVE BOX
Vascular Cardiology  Progress note  DIRECT SERVICE NUMBER: [phone]            EMAIL [email or web address]   OFFICE [phone]    CC: neck trauma      INTERVAL HISTORY: No events overnight. Pt denies any chest pain, sob, palpitations, LE edema, or skin changes.        Allergies    Allergy Status Unknown    Intolerances    	MEDICATIONS  (STANDING):  aspirin enteric coated 81 milliGRAM(s) Oral daily  enoxaparin Injectable 30 milliGRAM(s) SubCutaneous <User Schedule>  lactated ringers. 1000 milliLiter(s) (50 mL/Hr) IV Continuous <Continuous>  polyethylene glycol 3350 17 Gram(s) Oral every 12 hours  senna 2 Tablet(s) Oral at bedtime    MEDICATIONS  (PRN):  acetaminophen   Tablet .. 650 milliGRAM(s) Oral every 6 hours PRN Temp greater or equal to 38C (100.4F), Mild Pain (1 - 3)  baclofen 5 milliGRAM(s) Oral three times a day PRN Muscle Spasm  belladonna 16.2 mG/opium 30 mg Suppository 1 Suppository(s) Rectal daily PRN bladder spam  bisacodyl Suppository 10 milliGRAM(s) Rectal daily PRN Constipation  ondansetron Injectable 4 milliGRAM(s) IV Push every 6 hours PRN Nausea and/or Vomiting  oxyCODONE    IR 5 milliGRAM(s) Oral every 6 hours PRN Moderate Pain (4 - 6)  oxyCODONE    IR 10 milliGRAM(s) Oral every 6 hours PRN Severe Pain (7 - 10)      PAST MEDICAL & SURGICAL HISTORY:  Multiple sclerosis        FAMILY HISTORY: non-pertinent       SOCIAL HISTORY:  unchanged    REVIEW OF SYSTEMS:  CONSTITUTIONAL: No fevers or chills  EYES: No eye pain, visual disturbances, or discharge  ENT:  No difficulty hearing, tinnitus, vertigo; No sinus or throat pain  NECK: No pain or stiffness  RESPIRATORY:  No SOB  CARDIOVASCULAR:  No chest pain   GASTROINTESTINAL: No abdominal or epigastric pain. No nausea, vomiting, or hematemesis; No diarrhea or constipation. No melena or hematochezia.  GENITOURINARY: No dysuria, frequency, hematuria, or incontinence  NEUROLOGICAL: No headaches, memory loss, loss of strength, numbness, or tremors  SKIN: no rash  LYMPH Nodes: No enlarged glands  ENDOCRINE: No heat or cold intolerance; No hair loss  MUSCULOSKELETAL: No joint pain or swelling; No muscle, back, or extremity pain  HEME/LYMPH: No easy bruising, or bleeding gums  ALLERGY AND IMMUNOLOGIC: No hives or eczema	    [ x] All others negative	  [ ] Unable to obtain      ICU Vital Signs Last 24 Hrs  T(C): 36.6 (08 Apr 2021 09:15), Max: 36.9 (07 Apr 2021 19:25)  T(F): 97.9 (08 Apr 2021 09:15), Max: 98.4 (07 Apr 2021 19:25)  HR: 75 (08 Apr 2021 09:15) (75 - 87)  BP: 110/70 (08 Apr 2021 09:15) (102/66 - 126/80)  BP(mean): --  ABP: --  ABP(mean): --  RR: 18 (08 Apr 2021 09:15) (18 - 18)  SpO2: 96% (08 Apr 2021 09:15) (95% - 97%)      Appearance: NAD- in neck collar  HEENT:   Normal oral mucosa,  Lymphatic: No lymphadenopathy  Cardiovascular:  S1, S2, RRR  Respiratory: CTAB  Psychiatry:  AAO x 3   Gastrointestinal:  Soft, Non-tender, + BS	  Skin: No rashes, No ecchymoses, No cyanosis	  Neurologic: Loss of sensory and motor functions below the neck  Extremities:  No edema or skin changes                             12.8   10.89 )-----------( 228      ( 06 Apr 2021 16:20 )             39.9            04-06    141  |  102  |  29<H>  ----------------------------<  109<H>  4.0   |  27  |  0.81    Ca    8.2<L>      06 Apr 2021 16:20  Phos  4.3     04-06  Mg     2.4     04-06

## 2021-04-08 NOTE — PROGRESS NOTE ADULT - SUBJECTIVE AND OBJECTIVE BOX
***************************************************************  Nallely Smith MD, PGY1 Resident  Internal Medicine   pager: [phone]  ***************************************************************    JOSE LUIS RAM  55y  MRN: [id number]    Patient is a 55y old  Female who presents with a chief complaint of Tetraplegia/cervical cord injury (07 Apr 2021 15:59)      Subjective: no events ON. Denies fever, CP, SOB, abn pain, N/V, dysuria. Tolerating diet.      REVIEW OF SYSTEMS:    CONSTITUTIONAL: No weakness, fevers or chills  EYES/ENT: No visual changes;  No vertigo or throat pain   NECK: No pain or stiffness  RESPIRATORY: No cough, wheezing, hemoptysis; No shortness of breath  CARDIOVASCULAR: No chest pain or palpitations  GASTROINTESTINAL: No abdominal or epigastric pain. No nausea, vomiting, or hematemesis; No diarrhea or constipation. No melena or hematochezia.  GENITOURINARY: No dysuria, frequency or hematuria  NEUROLOGICAL: No numbness or weakness  SKIN: No itching, rashes      Objective:    MEDICATIONS  (STANDING):  aspirin enteric coated 81 milliGRAM(s) Oral daily  enoxaparin Injectable 30 milliGRAM(s) SubCutaneous <User Schedule>  lactated ringers. 1000 milliLiter(s) (50 mL/Hr) IV Continuous <Continuous>  polyethylene glycol 3350 17 Gram(s) Oral every 12 hours  senna 2 Tablet(s) Oral at bedtime    MEDICATIONS  (PRN):  acetaminophen   Tablet .. 650 milliGRAM(s) Oral every 6 hours PRN Temp greater or equal to 38C (100.4F), Mild Pain (1 - 3)  baclofen 5 milliGRAM(s) Oral three times a day PRN Muscle Spasm  belladonna 16.2 mG/opium 30 mg Suppository 1 Suppository(s) Rectal daily PRN bladder spam  bisacodyl Suppository 10 milliGRAM(s) Rectal daily PRN Constipation  ondansetron Injectable 4 milliGRAM(s) IV Push every 6 hours PRN Nausea and/or Vomiting  oxyCODONE    IR 5 milliGRAM(s) Oral every 6 hours PRN Moderate Pain (4 - 6)  oxyCODONE    IR 10 milliGRAM(s) Oral every 6 hours PRN Severe Pain (7 - 10)        Vitals: Vital Signs Last 24 Hrs  T(C): 36.8 (04-08-21 @ 04:36), Max: 36.9 (04-07-21 @ 19:25)  T(F): 98.2 (04-08-21 @ 04:36), Max: 98.4 (04-07-21 @ 19:25)  HR: 83 (04-08-21 @ 04:36) (80 - 88)  BP: 126/80 (04-08-21 @ 04:36) (102/66 - 126/80)  BP(mean): --  RR: 18 (04-08-21 @ 04:36) (18 - 18)  SpO2: 95% (04-08-21 @ 04:36) (94% - 100%)            I&O's Summary    07 Apr 2021 07:01  -  08 Apr 2021 07:00  --------------------------------------------------------  IN: 460 mL / OUT: 200 mL / NET: 260 mL        PHYSICAL EXAM:  GENERAL: NAD  HEAD:  Atraumatic, Normocephalic  EYES: EOMI, conjunctiva and sclera clear  CHEST/LUNG: Clear to percussion bilaterally; No rales, rhonchi, wheezing, or rubs  HEART: Regular rate and rhythm; No murmurs, rubs, or gallops  ABDOMEN: Soft, Nontender, Nondistended;   SKIN: No rashes or lesions  NERVOUS SYSTEM:  Alert & Oriented X3, no focal deficit    LABS:  04-06    141  |  102  |  29<H>  ----------------------------<  109<H>  4.0   |  27  |  0.81    Ca    8.2<L>      06 Apr 2021 16:20  Phos  4.3     04-06  Mg     2.4     04-06                                                12.8   10.89 )-----------( 228      ( 06 Apr 2021 16:20 )             39.9     CAPILLARY BLOOD GLUCOSE          RADIOLOGY & ADDITIONAL TESTS:    Imaging Personally Reviewed:  [x ] YES  [ ] NO    Consultants involved in case:   Consultant(s) Notes Reviewed:  [ x] YES  [ ] NO:   Care Discussed with Consultants/Other Providers [x ] YES  [ ] NO         ***************************************************************  Nallely Smith MD, PGY1 Resident  Internal Medicine   pager: [phone]  ***************************************************************    JOSE LUIS RAM  55y  MRN: [id number]    Patient is a 55y old  Female who presents with a chief complaint of Tetraplegia/cervical cord injury (07 Apr 2021 15:59)      Subjective: no events ON. Denies fever, CP, SOB, abn pain, N/V, dysuria. Tolerating diet.      REVIEW OF SYSTEMS: as noted above. Otherwise negative.      Objective:    MEDICATIONS  (STANDING):  aspirin enteric coated 81 milliGRAM(s) Oral daily  enoxaparin Injectable 30 milliGRAM(s) SubCutaneous <User Schedule>  lactated ringers. 1000 milliLiter(s) (50 mL/Hr) IV Continuous <Continuous>  polyethylene glycol 3350 17 Gram(s) Oral every 12 hours  senna 2 Tablet(s) Oral at bedtime    MEDICATIONS  (PRN):  acetaminophen   Tablet .. 650 milliGRAM(s) Oral every 6 hours PRN Temp greater or equal to 38C (100.4F), Mild Pain (1 - 3)  baclofen 5 milliGRAM(s) Oral three times a day PRN Muscle Spasm  belladonna 16.2 mG/opium 30 mg Suppository 1 Suppository(s) Rectal daily PRN bladder spam  bisacodyl Suppository 10 milliGRAM(s) Rectal daily PRN Constipation  ondansetron Injectable 4 milliGRAM(s) IV Push every 6 hours PRN Nausea and/or Vomiting  oxyCODONE    IR 5 milliGRAM(s) Oral every 6 hours PRN Moderate Pain (4 - 6)  oxyCODONE    IR 10 milliGRAM(s) Oral every 6 hours PRN Severe Pain (7 - 10)        Vitals: Vital Signs Last 24 Hrs  T(C): 36.8 (04-08-21 @ 04:36), Max: 36.9 (04-07-21 @ 19:25)  T(F): 98.2 (04-08-21 @ 04:36), Max: 98.4 (04-07-21 @ 19:25)  HR: 83 (04-08-21 @ 04:36) (80 - 88)  BP: 126/80 (04-08-21 @ 04:36) (102/66 - 126/80)  BP(mean): --  RR: 18 (04-08-21 @ 04:36) (18 - 18)  SpO2: 95% (04-08-21 @ 04:36) (94% - 100%)            I&O's Summary    07 Apr 2021 07:01  -  08 Apr 2021 07:00  --------------------------------------------------------  IN: 460 mL / OUT: 200 mL / NET: 260 mL        PHYSICAL EXAM:  GENERAL: 45 deg up in bed, C- collar in place  HEAD: Atraumatic, Normocephalic  NECK: in cervical brace  CHEST/LUNG: clear to auscultation bilaterally anteriorly   HEART: regular rate and rhythm, no murmurs  ABDOMEN: pt does maintain some sensation, mostly feels pins and needles with abdominal palpation  EXTREMITIES: no edema on bilateral LE or UE, no sensation to touch. warm extremities.   NEUROLOGY: A&O x3, quadriplegia, some sensation to touch. EOMI, able to raise eyebrows. speech coherent.     LABS:  04-06    141  |  102  |  29<H>  ----------------------------<  109<H>  4.0   |  27  |  0.81    Ca    8.2<L>      06 Apr 2021 16:20  Phos  4.3     04-06  Mg     2.4     04-06                                                12.8   10.89 )-----------( 228      ( 06 Apr 2021 16:20 )             39.9     CAPILLARY BLOOD GLUCOSE          RADIOLOGY & ADDITIONAL TESTS:    Imaging Personally Reviewed:  [x ] YES  [ ] NO    Consultants involved in case:   Consultant(s) Notes Reviewed:  [ x] YES  [ ] NO:   Care Discussed with Consultants/Other Providers [x ] YES  [ ] NO         ***************************************************************  Nallely Smith MD, PGY1 Resident  Internal Medicine   pager: [phone]  ***************************************************************    JOSE LUIS RAM  55y  MRN: [id number]    Patient is a 55y old  Female who presents with a chief complaint of Tetraplegia/cervical cord injury (07 Apr 2021 15:59)      Subjective: no events ON. Denies fever, CP, SOB, abn pain, N/V, dysuria. Tolerating diet.      REVIEW OF SYSTEMS: as noted above. Otherwise negative.      Objective:    MEDICATIONS  (STANDING):  aspirin enteric coated 81 milliGRAM(s) Oral daily  enoxaparin Injectable 30 milliGRAM(s) SubCutaneous <User Schedule>  lactated ringers. 1000 milliLiter(s) (50 mL/Hr) IV Continuous <Continuous>  polyethylene glycol 3350 17 Gram(s) Oral every 12 hours  senna 2 Tablet(s) Oral at bedtime    MEDICATIONS  (PRN):  acetaminophen   Tablet .. 650 milliGRAM(s) Oral every 6 hours PRN Temp greater or equal to 38C (100.4F), Mild Pain (1 - 3)  baclofen 5 milliGRAM(s) Oral three times a day PRN Muscle Spasm  belladonna 16.2 mG/opium 30 mg Suppository 1 Suppository(s) Rectal daily PRN bladder spam  bisacodyl Suppository 10 milliGRAM(s) Rectal daily PRN Constipation  ondansetron Injectable 4 milliGRAM(s) IV Push every 6 hours PRN Nausea and/or Vomiting  oxyCODONE    IR 5 milliGRAM(s) Oral every 6 hours PRN Moderate Pain (4 - 6)  oxyCODONE    IR 10 milliGRAM(s) Oral every 6 hours PRN Severe Pain (7 - 10)        Vitals: Vital Signs Last 24 Hrs  T(C): 36.8 (04-08-21 @ 04:36), Max: 36.9 (04-07-21 @ 19:25)  T(F): 98.2 (04-08-21 @ 04:36), Max: 98.4 (04-07-21 @ 19:25)  HR: 83 (04-08-21 @ 04:36) (80 - 88)  BP: 126/80 (04-08-21 @ 04:36) (102/66 - 126/80)  BP(mean): --  RR: 18 (04-08-21 @ 04:36) (18 - 18)  SpO2: 95% (04-08-21 @ 04:36) (94% - 100%)            I&O's Summary    07 Apr 2021 07:01  -  08 Apr 2021 07:00  --------------------------------------------------------  IN: 460 mL / OUT: 200 mL / NET: 260 mL        PHYSICAL EXAM:  GENERAL: 45 deg up in bed, C- collar in place  HEAD: Atraumatic, Normocephalic  NECK: in cervical brace  CHEST/LUNG: clear to auscultation bilaterally anteriorly   HEART: regular rate and rhythm, no murmurs  ABDOMEN: pt does maintain some sensation, mostly feels pins and needles with abdominal palpation  EXTREMITIES: no edema on bilateral LE or UE, no sensation to touch. warm extremities.   NEUROLOGY: A&O x3, quadriplegia, interval improvement sensation to touch on L. small movement on L foot. EOMI, able to raise eyebrows. speech coherent.     LABS:  04-06    141  |  102  |  29<H>  ----------------------------<  109<H>  4.0   |  27  |  0.81    Ca    8.2<L>      06 Apr 2021 16:20  Phos  4.3     04-06  Mg     2.4     04-06                                                12.8   10.89 )-----------( 228      ( 06 Apr 2021 16:20 )             39.9     CAPILLARY BLOOD GLUCOSE          RADIOLOGY & ADDITIONAL TESTS:    Imaging Personally Reviewed:  [x ] YES  [ ] NO    Consultants involved in case:   Consultant(s) Notes Reviewed:  [ x] YES  [ ] NO:   Care Discussed with Consultants/Other Providers [x ] YES  [ ] NO
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
## 2021-04-08 NOTE — PROGRESS NOTE ADULT - PROBLEM SELECTOR PLAN 3
- Had BM on 4/6 and 4/7  - Will continue suppository dulcolax - Had BM on 4/6 and 4/7  - Will continue suppository dulcolax prn

## 2021-04-08 NOTE — PROGRESS NOTE ADULT - PROBLEM SELECTOR PLAN 2
- B/l below knee carroll vein DVT  - High risk for progression as pt is quadriplegic  - Consulted vascular cardiology for possible IVC filter. At this time, repeat VA duplex b/l LE showing stable soleal DVT. No indication for IVC filter, continue lovenox  - Will repeat duplex 3 weeks after to see interval changes. Will hold for now as pt does not want - B/l below knee carroll vein DVT  - High risk for progression as pt is quadriplegic  - Consulted vascular cardiology for possible IVC filter. At this time, repeat VA duplex b/l LE showing stable soleal DVT. No indication for IVC filter, continue lovenox  - Will repeat duplex this Friday 4/9

## 2021-04-08 NOTE — PROGRESS NOTE ADULT - PROBLEM SELECTOR PLAN 4
- pt has poor inspiratory effort and unable to cough likely 2/2 spinal cord injury as above leading to diaphragm muscle weakness  - has not needed respiratory support since admission, on RA and SpO2 wnl but high risk for atelectasis given poor inspiratory effort.   - physical therapy on board. monitor spo2. - Will encourage incentive spirometry  - Will avoid valium to avoid respiratory depression
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
## 2021-04-09 PROCEDURE — 99232 SBSQ HOSP IP/OBS MODERATE 35: CPT | Mod: GC

## 2021-04-09 PROCEDURE — 99233 SBSQ HOSP IP/OBS HIGH 50: CPT

## 2021-04-09 RX ORDER — ASPIRIN/CALCIUM CARB/MAGNESIUM 324 MG
1 TABLET ORAL
Qty: 0 | Refills: 0 | DISCHARGE
Start: 2021-04-09

## 2021-04-09 RX ORDER — OXYCODONE HYDROCHLORIDE 5 MG/1
1 TABLET ORAL
Qty: 0 | Refills: 0 | DISCHARGE
Start: 2021-04-09

## 2021-04-09 RX ORDER — BACLOFEN 100 %
1 POWDER (GRAM) MISCELLANEOUS
Qty: 0 | Refills: 0 | DISCHARGE
Start: 2021-04-09

## 2021-04-09 RX ORDER — ONDANSETRON 8 MG/1
4 TABLET, FILM COATED ORAL
Qty: 0 | Refills: 0 | DISCHARGE

## 2021-04-09 RX ORDER — ENOXAPARIN SODIUM 100 MG/ML
30 INJECTION SUBCUTANEOUS
Qty: 0 | Refills: 0 | DISCHARGE
Start: 2021-04-09

## 2021-04-09 RX ORDER — JNJ-78436735 50000000000 [PFU]/.5ML
0.5 SUSPENSION INTRAMUSCULAR ONCE
Refills: 0 | Status: COMPLETED | OUTPATIENT
Start: 2021-04-09 | End: 2021-04-09

## 2021-04-09 RX ORDER — OXYCODONE HYDROCHLORIDE 5 MG/1
10 TABLET ORAL EVERY 6 HOURS
Refills: 0 | Status: DISCONTINUED | OUTPATIENT
Start: 2021-04-09 | End: 2021-04-12

## 2021-04-09 RX ORDER — OXYCODONE HYDROCHLORIDE 5 MG/1
5 TABLET ORAL EVERY 6 HOURS
Refills: 0 | Status: DISCONTINUED | OUTPATIENT
Start: 2021-04-09 | End: 2021-04-12

## 2021-04-09 RX ORDER — SENNA PLUS 8.6 MG/1
2 TABLET ORAL
Qty: 0 | Refills: 0 | DISCHARGE
Start: 2021-04-09

## 2021-04-09 RX ORDER — POLYETHYLENE GLYCOL 3350 17 G/17G
17 POWDER, FOR SOLUTION ORAL
Qty: 0 | Refills: 0 | DISCHARGE
Start: 2021-04-09

## 2021-04-09 RX ORDER — LANOLIN ALCOHOL/MO/W.PET/CERES
3 CREAM (GRAM) TOPICAL AT BEDTIME
Refills: 0 | Status: DISCONTINUED | OUTPATIENT
Start: 2021-04-09 | End: 2021-04-12

## 2021-04-09 RX ORDER — ACETAMINOPHEN 500 MG
2 TABLET ORAL
Qty: 0 | Refills: 0 | DISCHARGE
Start: 2021-04-09

## 2021-04-09 RX ORDER — ATROPA BELLADONNA AND OPIUM 16.2; 6 MG/1; MG/1
1 SUPPOSITORY RECTAL
Qty: 0 | Refills: 0 | DISCHARGE
Start: 2021-04-09

## 2021-04-09 RX ADMIN — OXYCODONE HYDROCHLORIDE 5 MILLIGRAM(S): 5 TABLET ORAL at 19:38

## 2021-04-09 RX ADMIN — OXYCODONE HYDROCHLORIDE 10 MILLIGRAM(S): 5 TABLET ORAL at 01:04

## 2021-04-09 RX ADMIN — ENOXAPARIN SODIUM 30 MILLIGRAM(S): 100 INJECTION SUBCUTANEOUS at 19:16

## 2021-04-09 RX ADMIN — OXYCODONE HYDROCHLORIDE 10 MILLIGRAM(S): 5 TABLET ORAL at 00:34

## 2021-04-09 RX ADMIN — SODIUM CHLORIDE 50 MILLILITER(S): 9 INJECTION, SOLUTION INTRAVENOUS at 04:05

## 2021-04-09 RX ADMIN — OXYCODONE HYDROCHLORIDE 5 MILLIGRAM(S): 5 TABLET ORAL at 20:08

## 2021-04-09 RX ADMIN — Medication 81 MILLIGRAM(S): at 11:52

## 2021-04-09 NOTE — PROGRESS NOTE ADULT - PROBLEM SELECTOR PLAN 5
- dx with MS; follow w Dr. Madrid at [facility], was told she is JUDY pos, has been off disease modifying therapy for at least 1 yr for concern of being immunosuppressed during COVID.    - neuro following - not recommending disease modifying therapy at this time and unlikely MS flair contributing to her presentation
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
## 2021-04-09 NOTE — PROGRESS NOTE ADULT - PROBLEM SELECTOR PLAN 3
- Had BM on 4/6 and 4/7  - Will continue suppository dulcolax prn - Had BM on 4/6 and 4/7  - Will continue suppository dulcolax prn along with senna and miralax
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
## 2021-04-09 NOTE — PROGRESS NOTE ADULT - SUBJECTIVE AND OBJECTIVE BOX
Chart reviewed, seen and examined this am.  She is most bothered by lack of control of her environment due to paralysis and her reliance on others.  She is not aware of any major sensorimotor changes.  Chart review reveals that she remains afebrile and oxygenating well. She is also voiding adequately and not in urinary retention. Spasms persist in lower body. She notes that passive ROM is very helpful in reducing discomfort related to these.     Physical exam:  In no distress, not using accessory mm of respiration, no nasal flaring.  Able to speak full sentences.  Cough is nearly non existent.  She is not tachycardic, nor bradycardic.  Shoulder shrug is better than seen 4/7.   She has allodynia bilaterally in the C3 dermatomes bilaterally.  There is an improved sensory exam with better light touch sensation including right and left hands, and legs.   There is light touch present at S3 bilat.  Voluntary motor function is present in hip adductors bilat and left hip extensor. I see no volitional movement in the upper limbs or trunk/abdominals.  Tone is MAS 3 in legs, improves with ROM.  Functional status is dependent for all ADL and mob.

## 2021-04-09 NOTE — CHART NOTE - NSCHARTNOTEFT_GEN_A_CORE
Provider called at bedside to do COVID 19 Vaccine questionnaire. Pt is refusing at this time and having nausea. Does not wish to get the vaccine today. Pt is A&O x3, and was evaluated by ethics and psych. Per recs, can refuse treatment. Will cancel the order and reassess before discharge. Provider called at bedside to do COVID 19 Vaccine questionnaire. Pt is refusing at this time and having nausea. Does not wish to get the vaccine today. Pt is A&O x3, and was evaluated by ethics and psych. Deemed to have capacity to make own health decisions. Per recs, can refuse treatment. Will cancel the order and reassess before discharge.
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
## 2021-04-09 NOTE — PROGRESS NOTE ADULT - PROBLEM SELECTOR PLAN 2
- B/l below knee carroll vein DVT  - High risk for progression as pt is quadriplegic  - Consulted vascular cardiology for possible IVC filter. At this time, repeat VA duplex b/l LE showing stable soleal DVT. No indication for IVC filter, continue lovenox  - Will order repeat duplex - B/l below knee carroll vein DVT  - High risk for progression as pt is quadriplegic  - Consulted vascular cardiology for possible IVC filter. At this time, repeat VA duplex b/l LE showing stable soleal DVT. No indication for IVC filter, continue lovenox  - Will mention in dc summary for repeat DVT scan

## 2021-04-09 NOTE — PROGRESS NOTE ADULT - ASSESSMENT
Assessment:    1. Bilateral soleal DVT  2. 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  Repeat Venous Duplex 4/5 with no progression of DVT.   2,  Continue with Lovenox 30mg QD. No indication for IVC filter.  3.  Plan for repeat venous duplex of the lower extremities today 4/9 for surveillance.    Thank you,  TOYN Lagunas, [facility]  Vascular Cardiology Service  [phone]

## 2021-04-09 NOTE — PROGRESS NOTE ADULT - PROBLEM SELECTOR PLAN 8
- DVT ppx: on lovenox 40mg QD  - Diet: Regular. Discussed risks of aspiration given her neurological injury from vertebral artery dissection. Pt refused FEES and preferred thin liquids and puree  - Dispo: pending placement in acute spinal cord injury rehab, accepted at [facility]

## 2021-04-09 NOTE — PROGRESS NOTE ADULT - ASSESSMENT
55 woman with C2 AIS C tetraplegia.  Rec:  1. Respiratory-diaphragm appears to be better innervated than neuro exam would suggest, but would continue to observe closely for slow deterioration that could occur by virtue of partial diaphragm innervation. Would check CO2, VC, NIF. Provide incentive spirometry three times daily at min.  2. Bladder-observe for now. Hope for increasing sensation of bladder fullness, urge to urinate. Too early to determine long term management.   3. Bowel-compensatory routine with laxatives (senna, Miralax, both ) at hs and suppository each am to attempt to trigger a BM via reflex peristalsis. She is likely to refuse this, however.  4. Orthostatic hypotension-will likely occur with attempts to sit her. Recommend thigh high compression stockings, abdominal binder, salt intake. Midodrine may be necessary.   5. Skin-turn every two hours with regular skin inspection. Areas at risk now are sacrum, heels.  6. DVTs-plan is for interval duplex US today to check for propagation of distal DVTs.   7. Discharge-acute rehab. I have had two conversations with her, one with her  about optimal programs. She is ready for discharge.  55 woman with C2 AIS C tetraplegia.  Rec:  1. Respiratory-diaphragm appears to be better innervated than neuro exam would suggest, but would continue to observe closely for slow deterioration that could occur by virtue of partial diaphragm innervation. Would check CO2, VC, NIF. Provide incentive spirometry three times daily at min.  2. Bladder-observe for now. Hope for increasing sensation of bladder fullness, urge to urinate. Too early to determine long term management.   3. Bowel-compensatory routine with laxatives (senna, Miralax, both ) at hs and suppository each am to attempt to trigger a BM via reflex peristalsis. She is likely to refuse this, however.  4. Orthostatic hypotension-will likely occur with attempts to sit her. Recommend thigh high compression stockings, abdominal binder, salt intake. Midodrine may be necessary.   5. Skin-turn every two hours with regular skin inspection. Areas at risk now are sacrum, heels.  6. DVTs-plan is for interval duplex US today to check for propagation of distal DVTs.   7. Spasticity-PROM regularly as staff can offer. Baclofen 5-10 mg q8h, can be prn for now. Diazepam not a good option for long term.   8. Discharge-acute rehab. I have had two conversations with her, one with her  about optimal programs. She is ready for discharge.
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
## 2021-04-09 NOTE — PROGRESS NOTE ADULT - SUBJECTIVE AND OBJECTIVE BOX
***************************************************************  Nallely Smith MD, PGY1 Resident  Internal Medicine   pager: [phone]  ***************************************************************    JOSE LUIS RAM  55y  MRN: [id number]    Patient is a 55y old  Female who presents with a chief complaint of Tetraplegia/cervical cord injury (07 Apr 2021 15:59)      Subjective: no events ON. Denies fever, CP, SOB, abn pain, N/V, dysuria. Tolerating diet.      REVIEW OF SYSTEMS:    CONSTITUTIONAL: No weakness, fevers or chills  EYES/ENT: No visual changes;  No vertigo or throat pain   NECK: No pain or stiffness  RESPIRATORY: No cough, wheezing, hemoptysis; No shortness of breath  CARDIOVASCULAR: No chest pain or palpitations  GASTROINTESTINAL: No abdominal or epigastric pain. No nausea, vomiting, or hematemesis; No diarrhea or constipation. No melena or hematochezia.  GENITOURINARY: No dysuria, frequency or hematuria  NEUROLOGICAL: No numbness or weakness  SKIN: No itching, rashes      Objective:    MEDICATIONS  (STANDING):  aspirin enteric coated 81 milliGRAM(s) Oral daily  enoxaparin Injectable 30 milliGRAM(s) SubCutaneous <User Schedule>  lactated ringers. 1000 milliLiter(s) (50 mL/Hr) IV Continuous <Continuous>  polyethylene glycol 3350 17 Gram(s) Oral every 12 hours  senna 2 Tablet(s) Oral at bedtime    MEDICATIONS  (PRN):  acetaminophen   Tablet .. 650 milliGRAM(s) Oral every 6 hours PRN Temp greater or equal to 38C (100.4F), Mild Pain (1 - 3)  baclofen 5 milliGRAM(s) Oral three times a day PRN Muscle Spasm  belladonna 16.2 mG/opium 30 mg Suppository 1 Suppository(s) Rectal daily PRN bladder spam  bisacodyl Suppository 10 milliGRAM(s) Rectal daily PRN Constipation  ondansetron Injectable 4 milliGRAM(s) IV Push every 6 hours PRN Nausea and/or Vomiting  oxyCODONE    IR 10 milliGRAM(s) Oral every 6 hours PRN Severe Pain (7 - 10)  oxyCODONE    IR 5 milliGRAM(s) Oral every 6 hours PRN Moderate Pain (4 - 6)        Vitals: Vital Signs Last 24 Hrs  T(C): 36.9 (04-09-21 @ 06:31), Max: 37.3 (04-09-21 @ 00:55)  T(F): 98.4 (04-09-21 @ 06:31), Max: 99.1 (04-09-21 @ 00:55)  HR: 76 (04-09-21 @ 06:31) (75 - 78)  BP: 130/83 (04-09-21 @ 06:31) (102/69 - 130/83)  BP(mean): --  RR: 18 (04-09-21 @ 06:31) (18 - 18)  SpO2: 98% (04-09-21 @ 06:31) (96% - 98%)            I&O's Summary    08 Apr 2021 07:01  -  09 Apr 2021 07:00  --------------------------------------------------------  IN: 2600 mL / OUT: 100 mL / NET: 2500 mL        PHYSICAL EXAM:  GENERAL: NAD  HEAD:  Atraumatic, Normocephalic  EYES: EOMI, conjunctiva and sclera clear  CHEST/LUNG: Clear to percussion bilaterally; No rales, rhonchi, wheezing, or rubs  HEART: Regular rate and rhythm; No murmurs, rubs, or gallops  ABDOMEN: Soft, Nontender, Nondistended;   SKIN: No rashes or lesions  NERVOUS SYSTEM:  Alert & Oriented X3, no focal deficit    LABS:  04-06    141  |  102  |  29<H>  ----------------------------<  109<H>  4.0   |  27  |  0.81    Ca    8.2<L>      06 Apr 2021 16:20                                                12.8   10.89 )-----------( 228      ( 06 Apr 2021 16:20 )             39.9     CAPILLARY BLOOD GLUCOSE          RADIOLOGY & ADDITIONAL TESTS:    Imaging Personally Reviewed:  [x ] YES  [ ] NO    Consultants involved in case:   Consultant(s) Notes Reviewed:  [ x] YES  [ ] NO:   Care Discussed with Consultants/Other Providers [x ] YES  [ ] NO         English ***************************************************************  Nallely Smith MD, PGY1 Resident  Internal Medicine   pager: [phone]  ***************************************************************    JOSE LUIS RAM  55y  MRN: [id number]    Patient is a 55y old  Female who presents with a chief complaint of Tetraplegia/cervical cord injury (07 Apr 2021 15:59)      Subjective: Refused miralax. Denies fever, CP, SOB, abn pain, N/V, dysuria. Tolerating diet.      REVIEW OF SYSTEMS: as noted above. Otherwise negative.       Objective:    MEDICATIONS  (STANDING):  aspirin enteric coated 81 milliGRAM(s) Oral daily  enoxaparin Injectable 30 milliGRAM(s) SubCutaneous <User Schedule>  lactated ringers. 1000 milliLiter(s) (50 mL/Hr) IV Continuous <Continuous>  polyethylene glycol 3350 17 Gram(s) Oral every 12 hours  senna 2 Tablet(s) Oral at bedtime    MEDICATIONS  (PRN):  acetaminophen   Tablet .. 650 milliGRAM(s) Oral every 6 hours PRN Temp greater or equal to 38C (100.4F), Mild Pain (1 - 3)  baclofen 5 milliGRAM(s) Oral three times a day PRN Muscle Spasm  belladonna 16.2 mG/opium 30 mg Suppository 1 Suppository(s) Rectal daily PRN bladder spam  bisacodyl Suppository 10 milliGRAM(s) Rectal daily PRN Constipation  ondansetron Injectable 4 milliGRAM(s) IV Push every 6 hours PRN Nausea and/or Vomiting  oxyCODONE    IR 10 milliGRAM(s) Oral every 6 hours PRN Severe Pain (7 - 10)  oxyCODONE    IR 5 milliGRAM(s) Oral every 6 hours PRN Moderate Pain (4 - 6)        Vitals: Vital Signs Last 24 Hrs  T(C): 36.9 (04-09-21 @ 06:31), Max: 37.3 (04-09-21 @ 00:55)  T(F): 98.4 (04-09-21 @ 06:31), Max: 99.1 (04-09-21 @ 00:55)  HR: 76 (04-09-21 @ 06:31) (75 - 78)  BP: 130/83 (04-09-21 @ 06:31) (102/69 - 130/83)  BP(mean): --  RR: 18 (04-09-21 @ 06:31) (18 - 18)  SpO2: 98% (04-09-21 @ 06:31) (96% - 98%)            I&O's Summary    08 Apr 2021 07:01  -  09 Apr 2021 07:00  --------------------------------------------------------  IN: 2600 mL / OUT: 100 mL / NET: 2500 mL        PHYSICAL EXAM:  GENERAL: 45 deg up in bed, C- collar in place  HEAD: Atraumatic, Normocephalic  NECK: in cervical brace  CHEST/LUNG: clear to auscultation bilaterally anteriorly   HEART: regular rate and rhythm, no murmurs  ABDOMEN: pt does maintain some sensation, mostly feels pins and needles with abdominal palpation  EXTREMITIES: no edema on bilateral LE or UE, no sensation to touch. warm extremities.   NEUROLOGY: A&O x3, quadriplegia, interval improvement sensation to touch on L. small movement on L foot. EOMI, able to raise eyebrows. speech coherent.   LABS:  04-06    141  |  102  |  29<H>  ----------------------------<  109<H>  4.0   |  27  |  0.81    Ca    8.2<L>      06 Apr 2021 16:20                                                12.8   10.89 )-----------( 228      ( 06 Apr 2021 16:20 )             39.9     CAPILLARY BLOOD GLUCOSE          RADIOLOGY & ADDITIONAL TESTS:    Imaging Personally Reviewed:  [x ] YES  [ ] NO    Consultants involved in case:   Consultant(s) Notes Reviewed:  [ x] YES  [ ] NO:   Care Discussed with Consultants/Other Providers [x ] YES  [ ] NO

## 2021-04-10 PROCEDURE — 99233 SBSQ HOSP IP/OBS HIGH 50: CPT | Mod: GC

## 2021-04-10 RX ORDER — CYCLOBENZAPRINE HYDROCHLORIDE 10 MG/1
5 TABLET, FILM COATED ORAL ONCE
Refills: 0 | Status: COMPLETED | OUTPATIENT
Start: 2021-04-10 | End: 2021-04-10

## 2021-04-10 RX ADMIN — ENOXAPARIN SODIUM 30 MILLIGRAM(S): 100 INJECTION SUBCUTANEOUS at 17:03

## 2021-04-10 RX ADMIN — OXYCODONE HYDROCHLORIDE 5 MILLIGRAM(S): 5 TABLET ORAL at 02:30

## 2021-04-10 RX ADMIN — CYCLOBENZAPRINE HYDROCHLORIDE 5 MILLIGRAM(S): 10 TABLET, FILM COATED ORAL at 02:00

## 2021-04-10 RX ADMIN — OXYCODONE HYDROCHLORIDE 10 MILLIGRAM(S): 5 TABLET ORAL at 22:53

## 2021-04-10 RX ADMIN — SODIUM CHLORIDE 50 MILLILITER(S): 9 INJECTION, SOLUTION INTRAVENOUS at 17:05

## 2021-04-10 RX ADMIN — SODIUM CHLORIDE 50 MILLILITER(S): 9 INJECTION, SOLUTION INTRAVENOUS at 02:02

## 2021-04-10 RX ADMIN — OXYCODONE HYDROCHLORIDE 5 MILLIGRAM(S): 5 TABLET ORAL at 02:00

## 2021-04-10 RX ADMIN — Medication 81 MILLIGRAM(S): at 11:33
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
## 2021-04-10 NOTE — PROGRESS NOTE ADULT - SUBJECTIVE AND OBJECTIVE BOX
PROGRESS NOTE:   Authoted by Dr. Andrew Borges MD, Pager [phone] [facility], [id number] [facility]     Patient is a 55y old  Female who presents with a chief complaint of Tetraplegia (09 Apr 2021 08:32)      SUBJECTIVE / OVERNIGHT EVENTS: Overnight patient received flexeril X1 for lower body spasms. Patient seen and examined at bedside.     ADDITIONAL REVIEW OF SYSTEMS:    MEDICATIONS  (STANDING):  aspirin enteric coated 81 milliGRAM(s) Oral daily  enoxaparin Injectable 30 milliGRAM(s) SubCutaneous <User Schedule>  lactated ringers. 1000 milliLiter(s) (50 mL/Hr) IV Continuous <Continuous>  polyethylene glycol 3350 17 Gram(s) Oral every 12 hours  senna 2 Tablet(s) Oral at bedtime    MEDICATIONS  (PRN):  acetaminophen   Tablet .. 650 milliGRAM(s) Oral every 6 hours PRN Temp greater or equal to 38C (100.4F), Mild Pain (1 - 3)  baclofen 5 milliGRAM(s) Oral three times a day PRN Muscle Spasm  belladonna 16.2 mG/opium 30 mg Suppository 1 Suppository(s) Rectal daily PRN bladder spam  bisacodyl Suppository 10 milliGRAM(s) Rectal daily PRN Constipation  melatonin 3 milliGRAM(s) Oral at bedtime PRN Insomnia  ondansetron Injectable 4 milliGRAM(s) IV Push every 6 hours PRN Nausea and/or Vomiting  oxyCODONE    IR 5 milliGRAM(s) Oral every 6 hours PRN Moderate Pain (4 - 6)  oxyCODONE    IR 10 milliGRAM(s) Oral every 6 hours PRN Severe Pain (7 - 10)      CAPILLARY BLOOD GLUCOSE        I&O's Summary    09 Apr 2021 07:01  -  10 Apr 2021 07:00  --------------------------------------------------------  IN: 1500 mL / OUT: 0 mL / NET: 1500 mL        PHYSICAL EXAM:  Vital Signs Last 24 Hrs  T(C): 36.6 (10 Apr 2021 06:52), Max: 36.6 (09 Apr 2021 11:00)  T(F): 97.9 (10 Apr 2021 06:52), Max: 97.9 (09 Apr 2021 20:55)  HR: 74 (10 Apr 2021 06:52) (65 - 81)  BP: 125/78 (10 Apr 2021 06:52) (112/73 - 125/78)  BP(mean): --  RR: 18 (10 Apr 2021 06:52) (18 - 18)  SpO2: 96% (10 Apr 2021 06:52) (92% - 96%)    CONSTITUTIONAL: NAD, well-developed  RESPIRATORY: Normal respiratory effort; lungs are clear to auscultation bilaterally  CARDIOVASCULAR: Regular rate and rhythm, normal S1 and S2, no murmur/rub/gallop; No lower extremity edema; Peripheral pulses are 2+ bilaterally  ABDOMEN: Nontender to palpation, normoactive bowel sounds, no rebound/guarding; No hepatosplenomegaly  MUSCLOSKELETAL: no clubbing or cyanosis of digits; no joint swelling or tenderness to palpation  PSYCH: A+O to person, place, and time; affect appropriate    LABS:                      RADIOLOGY & ADDITIONAL TESTS:  Results Reviewed:   Imaging Personally Reviewed:  Electrocardiogram Personally Reviewed:    COORDINATION OF CARE:  Care Discussed with Consultants/Other Providers [Y/N]:  Prior or Outpatient Records Reviewed [Y/N]:   PROGRESS NOTE:   Authoted by Dr. Andrew Borges MD, Pager [phone] [facility], [id number] [facility]     Patient is a 55y old  Female who presents with a chief complaint of Tetraplegia (09 Apr 2021 08:32)      SUBJECTIVE / OVERNIGHT EVENTS: Overnight patient received flexeril X1 for lower body spasms. Patient seen and examined at bedside. Patient continues to have body spasms and pain.    ADDITIONAL REVIEW OF SYSTEMS:    MEDICATIONS  (STANDING):  aspirin enteric coated 81 milliGRAM(s) Oral daily  enoxaparin Injectable 30 milliGRAM(s) SubCutaneous <User Schedule>  lactated ringers. 1000 milliLiter(s) (50 mL/Hr) IV Continuous <Continuous>  polyethylene glycol 3350 17 Gram(s) Oral every 12 hours  senna 2 Tablet(s) Oral at bedtime    MEDICATIONS  (PRN):  acetaminophen   Tablet .. 650 milliGRAM(s) Oral every 6 hours PRN Temp greater or equal to 38C (100.4F), Mild Pain (1 - 3)  baclofen 5 milliGRAM(s) Oral three times a day PRN Muscle Spasm  belladonna 16.2 mG/opium 30 mg Suppository 1 Suppository(s) Rectal daily PRN bladder spam  bisacodyl Suppository 10 milliGRAM(s) Rectal daily PRN Constipation  melatonin 3 milliGRAM(s) Oral at bedtime PRN Insomnia  ondansetron Injectable 4 milliGRAM(s) IV Push every 6 hours PRN Nausea and/or Vomiting  oxyCODONE    IR 5 milliGRAM(s) Oral every 6 hours PRN Moderate Pain (4 - 6)  oxyCODONE    IR 10 milliGRAM(s) Oral every 6 hours PRN Severe Pain (7 - 10)      CAPILLARY BLOOD GLUCOSE        I&O's Summary    09 Apr 2021 07:01  -  10 Apr 2021 07:00  --------------------------------------------------------  IN: 1500 mL / OUT: 0 mL / NET: 1500 mL        PHYSICAL EXAM:  Vital Signs Last 24 Hrs  T(C): 36.6 (10 Apr 2021 06:52), Max: 36.6 (09 Apr 2021 11:00)  T(F): 97.9 (10 Apr 2021 06:52), Max: 97.9 (09 Apr 2021 20:55)  HR: 74 (10 Apr 2021 06:52) (65 - 81)  BP: 125/78 (10 Apr 2021 06:52) (112/73 - 125/78)  BP(mean): --  RR: 18 (10 Apr 2021 06:52) (18 - 18)  SpO2: 96% (10 Apr 2021 06:52) (92% - 96%)    PHYSICAL EXAM:  General: No acute distress.  HEENT: NCAT.  PERRL.  EOMI.  No scleral icterus or injection.   Neck: Supple.  Full ROM.  No JVD.  Heart: RRR.  Normal S1 and S2.  No murmurs, rubs, or gallops.   Lungs: CTAB. No wheezes, crackles, or rhonchi.    Abdomen: BS+, soft, NT/ND.    Skin: Warm and dry.  No rashes.  Extremities: No edema, clubbing, or cyanosis.    Musculoskeletal: quadraplegia   Neuro: A&Ox3.  Unable to move extremities.       LABS:                      RADIOLOGY & ADDITIONAL TESTS:  Results Reviewed:   Imaging Personally Reviewed:  Electrocardiogram Personally Reviewed:    COORDINATION OF CARE:  Care Discussed with Consultants/Other Providers [Y/N]:  Prior or Outpatient Records Reviewed [Y/N]:

## 2021-04-10 NOTE — PROGRESS NOTE ADULT - PROBLEM SELECTOR PLAN 2
- B/l below knee carroll vein DVT  - High risk for progression as pt is quadriplegic  - Consulted vascular cardiology for possible IVC filter. At this time, repeat [facility] duplex b/l LE showing stable soleal DVT. No indication for IVC filter, continue lovenox  - Will mention in dc summary for repeat DVT scan

## 2021-04-10 NOTE — PROGRESS NOTE ADULT - PROBLEM SELECTOR PLAN 5
- dx with MS; follow w Dr. Madrid at [facility], was told she is JUDY pos, has been off disease modifying therapy for at least 1 yr for concern of being immunosuppressed during COVID.    - neuro following - not recommending disease modifying therapy at this time and unlikely MS flair contributing to her presentation

## 2021-04-10 NOTE — PROGRESS NOTE ADULT - ATTENDING COMMENTS
neck collar  requesting iv valium to help with whole body pain/sleep  spasticity legs  suggest continue baclofen - patient/RN encouraged to use PRN dosing  will titrate for optimal effect

## 2021-04-10 NOTE — PROGRESS NOTE ADULT - PROBLEM SELECTOR PLAN 8
- DVT ppx: on lovenox 40mg QD  - Diet: Regular. Discussed risks of aspiration given her neurological injury from vertebral artery dissection. Pt refused FEES and preferred thin liquids and puree  - Dispo: pending placement in acute spinal cord injury rehab, accepted at [facility]

## 2021-04-11 PROCEDURE — 85730 THROMBOPLASTIN TIME PARTIAL: CPT

## 2021-04-11 PROCEDURE — 85025 COMPLETE CBC W/AUTO DIFF WBC: CPT

## 2021-04-11 PROCEDURE — 70544 MR ANGIOGRAPHY HEAD W/O DYE: CPT

## 2021-04-11 PROCEDURE — 83605 ASSAY OF LACTIC ACID: CPT

## 2021-04-11 PROCEDURE — 80053 COMPREHEN METABOLIC PANEL: CPT

## 2021-04-11 PROCEDURE — 94150 VITAL CAPACITY TEST: CPT

## 2021-04-11 PROCEDURE — 84132 ASSAY OF SERUM POTASSIUM: CPT

## 2021-04-11 PROCEDURE — 85610 PROTHROMBIN TIME: CPT

## 2021-04-11 PROCEDURE — 92610 EVALUATE SWALLOWING FUNCTION: CPT

## 2021-04-11 PROCEDURE — 70450 CT HEAD/BRAIN W/O DYE: CPT

## 2021-04-11 PROCEDURE — 92526 ORAL FUNCTION THERAPY: CPT

## 2021-04-11 PROCEDURE — 84295 ASSAY OF SERUM SODIUM: CPT

## 2021-04-11 PROCEDURE — A9585: CPT

## 2021-04-11 PROCEDURE — 82947 ASSAY GLUCOSE BLOOD QUANT: CPT

## 2021-04-11 PROCEDURE — 97167 OT EVAL HIGH COMPLEX 60 MIN: CPT

## 2021-04-11 PROCEDURE — 97530 THERAPEUTIC ACTIVITIES: CPT

## 2021-04-11 PROCEDURE — 72125 CT NECK SPINE W/O DYE: CPT

## 2021-04-11 PROCEDURE — 82435 ASSAY OF BLOOD CHLORIDE: CPT

## 2021-04-11 PROCEDURE — 82803 BLOOD GASES ANY COMBINATION: CPT

## 2021-04-11 PROCEDURE — 97535 SELF CARE MNGMENT TRAINING: CPT

## 2021-04-11 PROCEDURE — 86803 HEPATITIS C AB TEST: CPT

## 2021-04-11 PROCEDURE — 93970 EXTREMITY STUDY: CPT

## 2021-04-11 PROCEDURE — 99285 EMERGENCY DEPT VISIT HI MDM: CPT | Mod: 25

## 2021-04-11 PROCEDURE — 85014 HEMATOCRIT: CPT

## 2021-04-11 PROCEDURE — 84100 ASSAY OF PHOSPHORUS: CPT

## 2021-04-11 PROCEDURE — 82330 ASSAY OF CALCIUM: CPT

## 2021-04-11 PROCEDURE — 72141 MRI NECK SPINE W/O DYE: CPT

## 2021-04-11 PROCEDURE — 80307 DRUG TEST PRSMV CHEM ANLYZR: CPT

## 2021-04-11 PROCEDURE — 0031A: CPT

## 2021-04-11 PROCEDURE — U0005: CPT

## 2021-04-11 PROCEDURE — 97110 THERAPEUTIC EXERCISES: CPT

## 2021-04-11 PROCEDURE — 85018 HEMOGLOBIN: CPT

## 2021-04-11 PROCEDURE — 99233 SBSQ HOSP IP/OBS HIGH 50: CPT | Mod: GC

## 2021-04-11 PROCEDURE — 70551 MRI BRAIN STEM W/O DYE: CPT

## 2021-04-11 PROCEDURE — 70498 CT ANGIOGRAPHY NECK: CPT

## 2021-04-11 PROCEDURE — 70549 MR ANGIOGRAPH NECK W/O&W/DYE: CPT

## 2021-04-11 PROCEDURE — 85027 COMPLETE CBC AUTOMATED: CPT

## 2021-04-11 PROCEDURE — 83735 ASSAY OF MAGNESIUM: CPT

## 2021-04-11 PROCEDURE — U0003: CPT

## 2021-04-11 PROCEDURE — 80048 BASIC METABOLIC PNL TOTAL CA: CPT

## 2021-04-11 PROCEDURE — 86769 SARS-COV-2 COVID-19 ANTIBODY: CPT

## 2021-04-11 PROCEDURE — 97163 PT EVAL HIGH COMPLEX 45 MIN: CPT

## 2021-04-11 RX ORDER — BACLOFEN 100 %
5 POWDER (GRAM) MISCELLANEOUS EVERY 8 HOURS
Refills: 0 | Status: DISCONTINUED | OUTPATIENT
Start: 2021-04-11 | End: 2021-04-12

## 2021-04-11 RX ORDER — BACLOFEN 100 %
10 POWDER (GRAM) MISCELLANEOUS THREE TIMES A DAY
Refills: 0 | Status: DISCONTINUED | OUTPATIENT
Start: 2021-04-11 | End: 2021-04-11

## 2021-04-11 RX ADMIN — OXYCODONE HYDROCHLORIDE 10 MILLIGRAM(S): 5 TABLET ORAL at 20:15

## 2021-04-11 RX ADMIN — OXYCODONE HYDROCHLORIDE 10 MILLIGRAM(S): 5 TABLET ORAL at 21:00

## 2021-04-11 RX ADMIN — ENOXAPARIN SODIUM 30 MILLIGRAM(S): 100 INJECTION SUBCUTANEOUS at 17:38

## 2021-04-11 RX ADMIN — Medication 81 MILLIGRAM(S): at 13:23

## 2021-04-11 RX ADMIN — POLYETHYLENE GLYCOL 3350 17 GRAM(S): 17 POWDER, FOR SOLUTION ORAL at 08:24

## 2021-04-11 RX ADMIN — OXYCODONE HYDROCHLORIDE 10 MILLIGRAM(S): 5 TABLET ORAL at 00:05

## 2021-04-11 RX ADMIN — SODIUM CHLORIDE 50 MILLILITER(S): 9 INJECTION, SOLUTION INTRAVENOUS at 06:22

## 2021-04-11 RX ADMIN — SODIUM CHLORIDE 50 MILLILITER(S): 9 INJECTION, SOLUTION INTRAVENOUS at 13:23
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
## 2021-04-11 NOTE — PROVIDER CONTACT NOTE (OTHER) - BACKGROUND
Pt admitted for C-spine injury resulting in quadriplegia. Patient states that since her having a prolapsed uterus/bladder since last year, she has had trouble moving bowels and last 2 weeks has had to
Vertebral artery dissection
pt admitted with vertebral artery dissection
Vertebral artery dissection
pt admitted for vertebral artery dissection
central cord syndrome
pt admitted for vertebral artery hemorrhage
pt admitted for vertebral artery dissection

## 2021-04-11 NOTE — PROGRESS NOTE ADULT - PROBLEM SELECTOR PLAN 2
- B/l below knee carroll vein DVT  - High risk for progression as pt is quadriplegic  - Consulted vascular cardiology for possible IVC filter. At this time, repeat VA duplex b/l LE showing stable soleal DVT. No indication for IVC filter, continue lovenox  - Will mention in dc summary for repeat DVT scan. Refused scan on 4/9

## 2021-04-11 NOTE — PROGRESS NOTE ADULT - ATTENDING COMMENTS
getting bedside ROM exercises  refusing baclofen so far  acute rehab ?4/12  bowel regimen  neck collar

## 2021-04-11 NOTE — PROVIDER CONTACT NOTE (OTHER) - ACTION/TREATMENT ORDERED:
Bolus given x2. Patient educated on importance of vasopressors and arterial line, Patient states she is "thinking about it"
provider at bedside aware and stated no need to do enema, can continue with suppository as needed.
team 1 Dr. Smith notified and to come speak to pt.
provider notified. no further orders.
2.5 mg valium ordered
Provider made aware. No new order received. Will continue to monitor.
provider notified, stated to have pt try the tap water enema tonight, pt notified & stated she wants to wait for the suppository "to do it's trick a little more" then she "will try the enema later".
Provider aware. Per MD try to give Miralax in 2 hrs. Order carried out. Will continue to monitor.
continue to monitor pt.
provider notified and to change frequency of bladder scan orders. ok to give suppository after iv fluids per provider.
team 1 Tatiana Gray made aware valium be ordered PO, no other orders received
MD aware, continue to monitor patient and continue plan of care
Valium 5 mg ordered. IV extended

## 2021-04-11 NOTE — PROVIDER CONTACT NOTE (OTHER) - NAME OF MD/NP/PA/DO NOTIFIED:
team 1 juanjose paris MD
team 1: Joo Matson Ae
Dr. Berrios
team 1 juanjose paris MD
Team 1
team 1 MD Nallely Smith
Arabella Peng
team 1 Nallely Smith MD
team 1 juanjose paris MD
MD May
team 1 Tatiana Gray
team 1 Dr. Tatiana Berrios

## 2021-04-11 NOTE — PROGRESS NOTE ADULT - PROBLEM SELECTOR PLAN 5
- dx with MS; follow w Dr. Madrid at [facility], was told she is JUDY pos, has been off disease modifying therapy for at least 1 yr for concern of being immunosuppressed during COVID.    - neuro following - not recommending disease modifying therapy at this time and unlikely MS flair contributing to her presentation
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
## 2021-04-11 NOTE — PROVIDER CONTACT NOTE (OTHER) - RECOMMENDATIONS
valium be ordered for pt. IV extended
Notify MD. continue to monitor patient and continue plan of care
continue to offer tap water enema
team 1 Tatiana Gray made aware will monitor pt
Notified provider.
pt be given 2.5 mg valium
Notified provider

## 2021-04-11 NOTE — PROVIDER CONTACT NOTE (OTHER) - REASON
pt refusing baclofen; states she may try it later. pt states she has no pain at this time; pt also refusing suppository, she states she wants to give the miralax time to work pt refusing baclofen; states she may try it later. pt states she has no pain at this time; pt also refusing suppository, she states she wants to give the miralax time to work last BM was 4/7

## 2021-04-11 NOTE — PROVIDER CONTACT NOTE (OTHER) - ASSESSMENT
Pt refusing Miralax. Last BM was on 4/7/21. Pt educated.
pt A&Ox4 her brother in law is visiting at bedside
pt is A+Ox4. pt is constipated and has not had a bowel movement since 3/31. pt is on laxatives. bowel sounds present. abdomen slightly distended. tap water enema ordered since 2 pm. pt has been requesting enema to be moved at a later time. pt educated on risks/benefits and importance of enema, but continues to want it when she is "more comfortable"
pt requesting something to assist with muscle spasms. requestion valium. Flexeril ordered but pt refusing flexeril and continuing to request valium. Additionally, refusing am miralax dose. Last BM yesterday and pt states she is "gassy" and will take the suppository and pm miralax dose. Patient refusing to allow RN to change IV at this time. educated on risks/benefits but continues to refuse.
see flowsheet for VS and neuro exam.
pt A&OX4   at bedside
manually disimpact herself. Concerned about how C-Spine injury could affect BMs and afraid to receive tap water enema or suppository. VSS, AO4
pt is A+Ox4. requesting 2.5 mg valium instead of 5. pt wants to take valium instead of gabapentin. pt additionally does not want to take senna at this time and wants to take the tap water enema at a later time.
pt a and ox4, VSS, bedrest, nonambulatory

## 2021-04-11 NOTE — PROVIDER CONTACT NOTE (OTHER) - REASON
pt states she wants to keep her cervical collar off now, she states it is uncomfortable and she wants her  to bring the inserts. pt states she "accepts full responsibility" for keeping it off. pt's  brandin aware and is telling pt to keep it on but pt refuses stating she needs a break from it

## 2021-04-11 NOTE — PROVIDER CONTACT NOTE (OTHER) - SITUATION
Patient refusing Gabapentin and Senna scheduled tonight. Patient expressing concern about receiving Senna or any type of enema/suppository.
pt refusing flexeril; requesting valium. refusing am miralax dose
pt refusing tap water enema at this time; pt wants to do it at a later time
Patient refuses arterial line and vasopressors. MAP goal is greater than 85, however patient refuses medications to achieve this goal. MD pickett at bedside educating patient
Pt refused Senna. Last BM was 4/7/21
pt refusing tap water enema, pt had small BM on night shift ,pt refuses Gabapentin, refuses senna, PRN valium injectable not available in pharmacy , pt asking for valium
Pt refusing Miralax. Last BM was on 4/7/21
pt requesting 2.5 mg valium, refusing senna and gabapentin at this time

## 2021-04-11 NOTE — PROGRESS NOTE ADULT - PROBLEM SELECTOR PLAN 8
- DVT ppx: on lovenox 40mg QD  - Diet: Regular. Discussed risks of aspiration given her neurological injury from vertebral artery dissection. Pt refused FEES and preferred thin liquids and puree  - Dispo: pending placement in acute spinal cord injury rehab, accepted at [facility] - DVT ppx: on lovenox 30mg QD  - Diet: Regular. Discussed risks of aspiration given her neurological injury from vertebral artery dissection. Pt refused FEES and preferred thin liquids and puree  - Dispo: pending placement in acute spinal cord injury rehab, accepted at [facility]

## 2021-04-11 NOTE — PROVIDER CONTACT NOTE (OTHER) - DATE AND TIME:
11-Apr-2021 13:58
06-Apr-2021 23:20
10-Apr-2021 22:30
03-Apr-2021 22:00
07-Apr-2021 08:34
11-Apr-2021 06:34
11-Apr-2021 18:59
02-Apr-2021 04:00
06-Apr-2021 16:21
04-Apr-2021 22:00
08-Apr-2021 04:30

## 2021-04-11 NOTE — PROGRESS NOTE ADULT - SUBJECTIVE AND OBJECTIVE BOX
***************************************************************  Nallely Smith MD, PGY1 Resident  Internal Medicine   pager: [phone]  ***************************************************************    JOSE LUIS RAM  55y  MRN: [id number]    Patient is a 55y old  Female who presents with a chief complaint of Tetraplegia (09 Apr 2021 08:32)      Subjective: Refusing miralax. Denies fever, CP, SOB, abn pain, N/V, dysuria. Tolerating diet.      REVIEW OF SYSTEMS:    CONSTITUTIONAL: No weakness, fevers or chills  EYES/ENT: No visual changes;  No vertigo or throat pain   NECK: No pain or stiffness  RESPIRATORY: No cough, wheezing, hemoptysis; No shortness of breath  CARDIOVASCULAR: No chest pain or palpitations  GASTROINTESTINAL: No abdominal or epigastric pain. No nausea, vomiting, or hematemesis; No diarrhea or constipation. No melena or hematochezia.  GENITOURINARY: No dysuria, frequency or hematuria  NEUROLOGICAL: No numbness or weakness  SKIN: No itching, rashes      Objective:    MEDICATIONS  (STANDING):  aspirin enteric coated 81 milliGRAM(s) Oral daily  enoxaparin Injectable 30 milliGRAM(s) SubCutaneous <User Schedule>  lactated ringers. 1000 milliLiter(s) (50 mL/Hr) IV Continuous <Continuous>  polyethylene glycol 3350 17 Gram(s) Oral every 12 hours  senna 2 Tablet(s) Oral at bedtime    MEDICATIONS  (PRN):  acetaminophen   Tablet .. 650 milliGRAM(s) Oral every 6 hours PRN Temp greater or equal to 38C (100.4F), Mild Pain (1 - 3)  baclofen 5 milliGRAM(s) Oral three times a day PRN Muscle Spasm  bisacodyl Suppository 10 milliGRAM(s) Rectal daily PRN Constipation  melatonin 3 milliGRAM(s) Oral at bedtime PRN Insomnia  ondansetron Injectable 4 milliGRAM(s) IV Push every 6 hours PRN Nausea and/or Vomiting  oxyCODONE    IR 5 milliGRAM(s) Oral every 6 hours PRN Moderate Pain (4 - 6)  oxyCODONE    IR 10 milliGRAM(s) Oral every 6 hours PRN Severe Pain (7 - 10)        Vitals: Vital Signs Last 24 Hrs  T(C): 36.4 (04-11-21 @ 06:06), Max: 36.9 (04-10-21 @ 20:20)  T(F): 97.5 (04-11-21 @ 06:06), Max: 98.5 (04-10-21 @ 20:20)  HR: 73 (04-11-21 @ 06:06) (68 - 86)  BP: 109/71 (04-11-21 @ 06:06) (104/64 - 110/75)  BP(mean): --  RR: 18 (04-11-21 @ 06:06) (18 - 18)  SpO2: 96% (04-11-21 @ 06:06) (95% - 97%)            I&O's Summary    10 Apr 2021 07:01  -  11 Apr 2021 07:00  --------------------------------------------------------  IN: 2360 mL / OUT: 0 mL / NET: 2360 mL        PHYSICAL EXAM:  GENERAL: NAD  HEAD:  Atraumatic, Normocephalic  EYES: EOMI, conjunctiva and sclera clear  CHEST/LUNG: Clear to percussion bilaterally; No rales, rhonchi, wheezing, or rubs  HEART: Regular rate and rhythm; No murmurs, rubs, or gallops  ABDOMEN: Soft, Nontender, Nondistended;   SKIN: No rashes or lesions  NERVOUS SYSTEM:  Alert & Oriented X3, no focal deficit      RADIOLOGY & ADDITIONAL TESTS:    Imaging Personally Reviewed:  [x ] YES  [ ] NO    Consultants involved in case:   Consultant(s) Notes Reviewed:  [ x] YES  [ ] NO:   Care Discussed with Consultants/Other Providers [x ] YES  [ ] NO         ***************************************************************  Nallely Smith MD, PGY1 Resident  Internal Medicine   pager: [phone]  ***************************************************************    JOSE LUIS RAM  55y  MRN: [id number]    Patient is a 55y old  Female who presents with a chief complaint of Tetraplegia (09 Apr 2021 08:32)      Subjective: Refusing miralax. Was willing to try little sips of miralax. Has spastic pain, wishes she could get valium but explained the risks of respiratory depression and recommendations per PM&R team. Denies fever, CP, SOB, abn pain, N/V, dysuria. Tolerating diet.      REVIEW OF SYSTEMS: as noted above. Otherwise negative.    Objective:    MEDICATIONS  (STANDING):  aspirin enteric coated 81 milliGRAM(s) Oral daily  enoxaparin Injectable 30 milliGRAM(s) SubCutaneous <User Schedule>  lactated ringers. 1000 milliLiter(s) (50 mL/Hr) IV Continuous <Continuous>  polyethylene glycol 3350 17 Gram(s) Oral every 12 hours  senna 2 Tablet(s) Oral at bedtime    MEDICATIONS  (PRN):  acetaminophen   Tablet .. 650 milliGRAM(s) Oral every 6 hours PRN Temp greater or equal to 38C (100.4F), Mild Pain (1 - 3)  baclofen 5 milliGRAM(s) Oral three times a day PRN Muscle Spasm  bisacodyl Suppository 10 milliGRAM(s) Rectal daily PRN Constipation  melatonin 3 milliGRAM(s) Oral at bedtime PRN Insomnia  ondansetron Injectable 4 milliGRAM(s) IV Push every 6 hours PRN Nausea and/or Vomiting  oxyCODONE    IR 5 milliGRAM(s) Oral every 6 hours PRN Moderate Pain (4 - 6)  oxyCODONE    IR 10 milliGRAM(s) Oral every 6 hours PRN Severe Pain (7 - 10)        Vitals: Vital Signs Last 24 Hrs  T(C): 36.4 (04-11-21 @ 06:06), Max: 36.9 (04-10-21 @ 20:20)  T(F): 97.5 (04-11-21 @ 06:06), Max: 98.5 (04-10-21 @ 20:20)  HR: 73 (04-11-21 @ 06:06) (68 - 86)  BP: 109/71 (04-11-21 @ 06:06) (104/64 - 110/75)  BP(mean): --  RR: 18 (04-11-21 @ 06:06) (18 - 18)  SpO2: 96% (04-11-21 @ 06:06) (95% - 97%)            I&O's Summary    10 Apr 2021 07:01  -  11 Apr 2021 07:00  --------------------------------------------------------  IN: 2360 mL / OUT: 0 mL / NET: 2360 mL        PHYSICAL EXAM:  GENERAL: NAD  CHEST/LUNG: Clear to percussion bilaterally; No rales, rhonchi, wheezing, or rubs  HEART: Regular rate and rhythm; No murmurs, rubs, or gallops  ABDOMEN: Soft, Nontender, Nondistended; normoactive bowel sounds  SKIN: No rashes or lesions  NERVOUS SYSTEM:  Alert & Oriented X3, no focal deficit      RADIOLOGY & ADDITIONAL TESTS:    Imaging Personally Reviewed:  [x ] YES  [ ] NO    Consultants involved in case:   Consultant(s) Notes Reviewed:  [ x] YES  [ ] NO:   Care Discussed with Consultants/Other Providers [x ] YES  [ ] NO         ***************************************************************  Nallely Smith MD, PGY1 Resident  Internal Medicine   pager: [phone]  ***************************************************************    JOSE LUIS RAM  55y  MRN: [id number]    Patient is a 55y old  Female who presents with a chief complaint of Tetraplegia (09 Apr 2021 08:32)      Subjective: Refusing miralax. Was willing to try little sips of miralax. Has spastic pain, wishes she could get valium but explained the risks of respiratory depression and recommendations per PM&R team. Denies fever, CP, SOB, abn pain, N/V, dysuria. Tolerating diet.      REVIEW OF SYSTEMS: as noted above. Otherwise negative.    Objective:    MEDICATIONS  (STANDING):  aspirin enteric coated 81 milliGRAM(s) Oral daily  enoxaparin Injectable 30 milliGRAM(s) SubCutaneous <User Schedule>  lactated ringers. 1000 milliLiter(s) (50 mL/Hr) IV Continuous <Continuous>  polyethylene glycol 3350 17 Gram(s) Oral every 12 hours  senna 2 Tablet(s) Oral at bedtime    MEDICATIONS  (PRN):  acetaminophen   Tablet .. 650 milliGRAM(s) Oral every 6 hours PRN Temp greater or equal to 38C (100.4F), Mild Pain (1 - 3)  baclofen 5 milliGRAM(s) Oral three times a day PRN Muscle Spasm  bisacodyl Suppository 10 milliGRAM(s) Rectal daily PRN Constipation  melatonin 3 milliGRAM(s) Oral at bedtime PRN Insomnia  ondansetron Injectable 4 milliGRAM(s) IV Push every 6 hours PRN Nausea and/or Vomiting  oxyCODONE    IR 5 milliGRAM(s) Oral every 6 hours PRN Moderate Pain (4 - 6)  oxyCODONE    IR 10 milliGRAM(s) Oral every 6 hours PRN Severe Pain (7 - 10)        Vitals: Vital Signs Last 24 Hrs  T(C): 36.4 (04-11-21 @ 06:06), Max: 36.9 (04-10-21 @ 20:20)  T(F): 97.5 (04-11-21 @ 06:06), Max: 98.5 (04-10-21 @ 20:20)  HR: 73 (04-11-21 @ 06:06) (68 - 86)  BP: 109/71 (04-11-21 @ 06:06) (104/64 - 110/75)  BP(mean): --  RR: 18 (04-11-21 @ 06:06) (18 - 18)  SpO2: 96% (04-11-21 @ 06:06) (95% - 97%)            I&O's Summary    10 Apr 2021 07:01  -  11 Apr 2021 07:00  --------------------------------------------------------  IN: 2360 mL / OUT: 0 mL / NET: 2360 mL        PHYSICAL EXAM:  GENERAL: 45 deg up in bed, C- collar in place  HEAD: Atraumatic, Normocephalic  NECK: in cervical brace  CHEST/LUNG: clear to auscultation bilaterally anteriorly   HEART: regular rate and rhythm, no murmurs  ABDOMEN: pt does maintain some sensation, mostly feels pins and needles with abdominal palpation  EXTREMITIES: no edema on bilateral LE or UE, no sensation to touch. warm extremities.   NEUROLOGY: A&O x3, quadriplegia, interval improvement sensation to touch on L. no spasm noted when bending knee but left upper extremities had stiff muscle and was painful at times per pt, EOMI, able to raise eyebrows. speech coherent.       RADIOLOGY & ADDITIONAL TESTS:    Imaging Personally Reviewed:  [x ] YES  [ ] NO    Consultants involved in case:   Consultant(s) Notes Reviewed:  [ x] YES  [ ] NO:   Care Discussed with Consultants/Other Providers [x ] YES  [ ] NO
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
## 2021-04-12 ENCOUNTER — TRANSCRIPTION ENCOUNTER (OUTPATIENT)
Age: 56
End: 2021-04-12

## 2021-04-12 VITALS
RESPIRATION RATE: 18 BRPM | TEMPERATURE: 97 F | DIASTOLIC BLOOD PRESSURE: 76 MMHG | OXYGEN SATURATION: 94 % | HEART RATE: 78 BPM | SYSTOLIC BLOOD PRESSURE: 121 MMHG

## 2021-04-12 LAB — SARS-COV-2 RNA SPEC QL NAA+PROBE: SIGNIFICANT CHANGE UP

## 2021-04-12 PROCEDURE — 99239 HOSP IP/OBS DSCHRG MGMT >30: CPT

## 2021-04-12 PROCEDURE — 99232 SBSQ HOSP IP/OBS MODERATE 35: CPT

## 2021-04-12 RX ORDER — LANOLIN ALCOHOL/MO/W.PET/CERES
1 CREAM (GRAM) TOPICAL
Qty: 0 | Refills: 0 | DISCHARGE
Start: 2021-04-12

## 2021-04-12 NOTE — BH CONSULTATION LIAISON PROGRESS NOTE - NSBHCHARTREVIEWVS_PSY_A_CORE FT
Vital Signs Last 24 Hrs  T(C): 36.6 (12 Apr 2021 05:13), Max: 37.1 (11 Apr 2021 13:05)  T(F): 97.8 (12 Apr 2021 05:13), Max: 98.8 (11 Apr 2021 20:52)  HR: 61 (12 Apr 2021 05:13) (60 - 67)  BP: 117/63 (12 Apr 2021 05:13) (117/63 - 123/86)  BP(mean): --  RR: 18 (12 Apr 2021 05:13) (18 - 18)  SpO2: 95% (12 Apr 2021 05:13) (95% - 97%)

## 2021-04-12 NOTE — BH CONSULTATION LIAISON PROGRESS NOTE - CURRENT MEDICATION
MEDICATIONS  (STANDING):  aspirin enteric coated 81 milliGRAM(s) Oral daily  baclofen 5 milliGRAM(s) Oral every 8 hours  enoxaparin Injectable 30 milliGRAM(s) SubCutaneous <User Schedule>  lactated ringers. 1000 milliLiter(s) (50 mL/Hr) IV Continuous <Continuous>  polyethylene glycol 3350 17 Gram(s) Oral every 12 hours  senna 2 Tablet(s) Oral at bedtime    MEDICATIONS  (PRN):  acetaminophen   Tablet .. 650 milliGRAM(s) Oral every 6 hours PRN Temp greater or equal to 38C (100.4F), Mild Pain (1 - 3)  bisacodyl 5 milliGRAM(s) Oral every 12 hours PRN Constipation  bisacodyl Suppository 10 milliGRAM(s) Rectal daily PRN Constipation  melatonin 3 milliGRAM(s) Oral at bedtime PRN Insomnia  ondansetron Injectable 4 milliGRAM(s) IV Push every 6 hours PRN Nausea and/or Vomiting  oxyCODONE    IR 5 milliGRAM(s) Oral every 6 hours PRN Moderate Pain (4 - 6)  oxyCODONE    IR 10 milliGRAM(s) Oral every 6 hours PRN Severe Pain (7 - 10)

## 2021-04-12 NOTE — DISCHARGE NOTE NURSING/CASE MANAGEMENT/SOCIAL WORK - NSDCPETBCESMAN_GEN_ALL_CORE
If you are a smoker, it is important for your health to stop smoking. Please be aware that second hand smoke is also harmful. 67

## 2021-04-12 NOTE — PROGRESS NOTE ADULT - NUTRITIONAL ASSESSMENT
This patient has been assessed with a concern for Malnutrition and has been determined to have a diagnosis/diagnoses of Severe protein-calorie malnutrition and Underweight (BMI < 19).    This patient is being managed with:   Diet Regular-  Supplement Feeding Modality:  Oral  Ensure Enlive Cans or Servings Per Day:  1       Frequency:  Three Times a day  Entered: Apr 5 2021  1:41PM    

## 2021-04-12 NOTE — PROGRESS NOTE ADULT - PROVIDER SPECIALTY LIST ADULT
Internal Medicine
NSICU
Orthopedics
Rehab Medicine
Urology
Vascular Cardiology
NSICU
Vascular Cardiology
NSICU
NSICU
Orthopedics
Rehab Medicine
Internal Medicine

## 2021-04-12 NOTE — PROGRESS NOTE ADULT - NSICDXPILOT_GEN_ALL_CORE
[place]

## 2021-04-12 NOTE — PROGRESS NOTE ADULT - ATTENDING SUPERVISION STATEMENT
Fellow
Resident
Fellow
Fellow
Resident

## 2021-04-12 NOTE — PROGRESS NOTE ADULT - PROBLEM SELECTOR PLAN 8
- DVT ppx: on lovenox 30mg QD  - Diet: Regular. Discussed risks of aspiration given her neurological injury from vertebral artery dissection. Pt refused FEES and preferred thin liquids and puree  - Dispo: pending placement in acute spinal cord injury rehab, accepted at [facility] - DVT ppx: on lovenox 30mg QD  - Diet: Regular. Discussed risks of aspiration given her neurological injury from vertebral artery dissection. Pt refused FEES and preferred thin liquids and puree  - Dispo: Accepted at [facility]

## 2021-04-12 NOTE — PROGRESS NOTE ADULT - ASSESSMENT
Assessment:    1. Bilateral soleal DVT  2. 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  Repeat Venous Duplex 4/5 with no progression of DVT.   2,  Continue with Lovenox 30mg QD. No indication for IVC filter.  3.  Plan for repeat venous duplex of the lower extremities, on vascular US lab schedule for today.    Thank you,  TONY Lagunas, MPAS  Vascular Cardiology Service  [phone]

## 2021-04-12 NOTE — PROGRESS NOTE ADULT - SUBJECTIVE AND OBJECTIVE BOX
***************************************************************  Nallely Smith MD, PGY1 Resident  Internal Medicine   pager: [phone]  ***************************************************************    JOSE LUIS RAM  55y  MRN: [id number]    Patient is a 55y old  Female who presents with a chief complaint of quadriplegia (11 Apr 2021 07:33)      Subjective: no events ON. Denies fever, CP, SOB, abn pain, N/V, dysuria. Tolerating diet.      REVIEW OF SYSTEMS:    CONSTITUTIONAL: No weakness, fevers or chills  EYES/ENT: No visual changes;  No vertigo or throat pain   NECK: No pain or stiffness  RESPIRATORY: No cough, wheezing, hemoptysis; No shortness of breath  CARDIOVASCULAR: No chest pain or palpitations  GASTROINTESTINAL: No abdominal or epigastric pain. No nausea, vomiting, or hematemesis; No diarrhea or constipation. No melena or hematochezia.  GENITOURINARY: No dysuria, frequency or hematuria  NEUROLOGICAL: No numbness or weakness  SKIN: No itching, rashes      Objective:    MEDICATIONS  (STANDING):  aspirin enteric coated 81 milliGRAM(s) Oral daily  baclofen 5 milliGRAM(s) Oral every 8 hours  enoxaparin Injectable 30 milliGRAM(s) SubCutaneous <User Schedule>  lactated ringers. 1000 milliLiter(s) (50 mL/Hr) IV Continuous <Continuous>  polyethylene glycol 3350 17 Gram(s) Oral every 12 hours  senna 2 Tablet(s) Oral at bedtime    MEDICATIONS  (PRN):  acetaminophen   Tablet .. 650 milliGRAM(s) Oral every 6 hours PRN Temp greater or equal to 38C (100.4F), Mild Pain (1 - 3)  bisacodyl Suppository 10 milliGRAM(s) Rectal daily PRN Constipation  melatonin 3 milliGRAM(s) Oral at bedtime PRN Insomnia  ondansetron Injectable 4 milliGRAM(s) IV Push every 6 hours PRN Nausea and/or Vomiting  oxyCODONE    IR 5 milliGRAM(s) Oral every 6 hours PRN Moderate Pain (4 - 6)  oxyCODONE    IR 10 milliGRAM(s) Oral every 6 hours PRN Severe Pain (7 - 10)        Vitals: Vital Signs Last 24 Hrs  T(C): 36.6 (04-12-21 @ 05:13), Max: 37.1 (04-11-21 @ 13:05)  T(F): 97.8 (04-12-21 @ 05:13), Max: 98.8 (04-11-21 @ 20:52)  HR: 61 (04-12-21 @ 05:13) (60 - 67)  BP: 117/63 (04-12-21 @ 05:13) (117/63 - 123/86)  BP(mean): --  RR: 18 (04-12-21 @ 05:13) (18 - 18)  SpO2: 95% (04-12-21 @ 05:13) (95% - 97%)            I&O's Summary    11 Apr 2021 07:01  -  12 Apr 2021 07:00  --------------------------------------------------------  IN: 2500 mL / OUT: 0 mL / NET: 2500 mL        PHYSICAL EXAM:  GENERAL: NAD  HEAD:  Atraumatic, Normocephalic  EYES: EOMI, conjunctiva and sclera clear  CHEST/LUNG: Clear to percussion bilaterally; No rales, rhonchi, wheezing, or rubs  HEART: Regular rate and rhythm; No murmurs, rubs, or gallops  ABDOMEN: Soft, Nontender, Nondistended;   SKIN: No rashes or lesions  NERVOUS SYSTEM:  Alert & Oriented X3, no focal deficit    RADIOLOGY & ADDITIONAL TESTS:    Imaging Personally Reviewed:  [x ] YES  [ ] NO    Consultants involved in case:   Consultant(s) Notes Reviewed:  [ x] YES  [ ] NO:   Care Discussed with Consultants/Other Providers [x ] YES  [ ] NO         ***************************************************************  Nallely Smith MD, PGY1 Resident  Internal Medicine   pager: [phone]  ***************************************************************    JOSE LUIS RAM  55y  MRN: [id number]    Patient is a 55y old  Female who presents with a chief complaint of quadriplegia (11 Apr 2021 07:33)      Subjective: no events ON. refusing baclofen. Denies fever, CP, SOB, abn pain, N/V, dysuria. Tolerating diet.      REVIEW OF SYSTEMS: as noted above. Otherwise negative.    Objective:    MEDICATIONS  (STANDING):  aspirin enteric coated 81 milliGRAM(s) Oral daily  baclofen 5 milliGRAM(s) Oral every 8 hours  enoxaparin Injectable 30 milliGRAM(s) SubCutaneous <User Schedule>  lactated ringers. 1000 milliLiter(s) (50 mL/Hr) IV Continuous <Continuous>  polyethylene glycol 3350 17 Gram(s) Oral every 12 hours  senna 2 Tablet(s) Oral at bedtime    MEDICATIONS  (PRN):  acetaminophen   Tablet .. 650 milliGRAM(s) Oral every 6 hours PRN Temp greater or equal to 38C (100.4F), Mild Pain (1 - 3)  bisacodyl Suppository 10 milliGRAM(s) Rectal daily PRN Constipation  melatonin 3 milliGRAM(s) Oral at bedtime PRN Insomnia  ondansetron Injectable 4 milliGRAM(s) IV Push every 6 hours PRN Nausea and/or Vomiting  oxyCODONE    IR 5 milliGRAM(s) Oral every 6 hours PRN Moderate Pain (4 - 6)  oxyCODONE    IR 10 milliGRAM(s) Oral every 6 hours PRN Severe Pain (7 - 10)        Vitals: Vital Signs Last 24 Hrs  T(C): 36.6 (04-12-21 @ 05:13), Max: 37.1 (04-11-21 @ 13:05)  T(F): 97.8 (04-12-21 @ 05:13), Max: 98.8 (04-11-21 @ 20:52)  HR: 61 (04-12-21 @ 05:13) (60 - 67)  BP: 117/63 (04-12-21 @ 05:13) (117/63 - 123/86)  BP(mean): --  RR: 18 (04-12-21 @ 05:13) (18 - 18)  SpO2: 95% (04-12-21 @ 05:13) (95% - 97%)            I&O's Summary    11 Apr 2021 07:01  -  12 Apr 2021 07:00  --------------------------------------------------------  IN: 2500 mL / OUT: 0 mL / NET: 2500 mL        PHYSICAL EXAM:  GENERAL: 45 deg up in bed, C- collar in place  HEAD: Atraumatic, Normocephalic  NECK: in cervical brace  CHEST/LUNG: clear to auscultation bilaterally anteriorly   HEART: regular rate and rhythm, no murmurs  ABDOMEN: pt does maintain some sensation, mostly feels pins and needles with abdominal palpation  EXTREMITIES: no edema on bilateral LE or UE, no sensation to touch. warm extremities.   NEUROLOGY: A&O x3, quadriplegia, interval improvement sensation to touch on L. no spasm noted, EOMI, able to raise eyebrows. speech coherent.       RADIOLOGY & ADDITIONAL TESTS:    Imaging Personally Reviewed:  [x ] YES  [ ] NO    Consultants involved in case:   Consultant(s) Notes Reviewed:  [ x] YES  [ ] NO:   Care Discussed with Consultants/Other Providers [x ] YES  [ ] NO

## 2021-04-12 NOTE — PROGRESS NOTE ADULT - SUBJECTIVE AND OBJECTIVE BOX
Vascular Cardiology  Progress note  DIRECT SERVICE NUMBER: [phone]            EMAIL [email or web address]   OFFICE [phone]    CC: neck trauma      INTERVAL HISTORY: No events overnight. Pt denies any chest pain, sob, palpitations, LE edema, or skin changes.     Allergies  No Known Allergies    MEDICATIONS  (STANDING):  aspirin enteric coated 81 milliGRAM(s) Oral daily  baclofen 5 milliGRAM(s) Oral every 8 hours  enoxaparin Injectable 30 milliGRAM(s) SubCutaneous <User Schedule>  lactated ringers. 1000 milliLiter(s) (50 mL/Hr) IV Continuous <Continuous>  polyethylene glycol 3350 17 Gram(s) Oral every 12 hours  senna 2 Tablet(s) Oral at bedtime    PAST MEDICAL & SURGICAL HISTORY:  Multiple sclerosis    FAMILY HISTORY: non-pertinent     SOCIAL HISTORY:  unchanged    REVIEW OF SYSTEMS:  CONSTITUTIONAL: No fever  EYES: No eye pain  ENT:  No sinus or throat pain  NECK: No pain  RESPIRATORY:  No SOB  CARDIOVASCULAR:  No chest pain   GASTROINTESTINAL: No abdominal pain  GENITOURINARY: No hematuria  NEUROLOGICAL: No loss of strength  SKIN: no rash  LYMPH Nodes: No enlarged glands noted  ENDOCRINE: No heat or cold intolerance noted  MUSCULOSKELETAL: Weakness  HEME/LYMPH: No bleeding gums  ALLERGY AND IMMUNOLOGIC: No hives	    [ x] All others negative	    Vital Signs Last 24 Hrs  T(C): 36.6 (12 Apr 2021 05:13), Max: 37.1 (11 Apr 2021 13:05)  T(F): 97.8 (12 Apr 2021 05:13), Max: 98.8 (11 Apr 2021 20:52)  HR: 61 (12 Apr 2021 05:13) (60 - 67)  BP: 117/63 (12 Apr 2021 05:13) (117/63 - 123/86)  RR: 18 (12 Apr 2021 05:13) (18 - 18)  SpO2: 95% (12 Apr 2021 05:13) (95% - 97%)    Appearance: NAD- in neck collar  HEENT:   Normal oral mucosa  Cardiovascular:  S1, S2, RRR  Respiratory: CTA B/L  Psychiatry:  AAO x 3   Gastrointestinal:  Soft, Non-tender, + BS	  Skin: No rashes, No ecchymoses, No cyanosis	  Neurologic: Loss of sensory and motor functions below the neck  Extremities:  No edema or skin changes     Labs: see sunrise

## 2021-04-12 NOTE — PROGRESS NOTE ADULT - ATTENDING COMMENTS
getting bedside ROM exercises  refusing baclofen so far  acute rehab 4/12  bowel regimen  neck collar  Discharge time spent: 36 min

## 2021-04-12 NOTE — PROGRESS NOTE ADULT - PROBLEM SELECTOR PLAN 5
- dx with MS; follow w Dr. Madrid at [facility], was told she is JUDY pos, has been off disease modifying therapy for at least 1 yr for concern of being immunosuppressed during COVID.    - neuro following - not recommending disease modifying therapy at this time and unlikely MS flair contributing to her presentation

## 2021-04-12 NOTE — PROGRESS NOTE ADULT - PROBLEM SELECTOR PLAN 2
- B/l below knee carroll vein DVT  - High risk for progression as pt is quadriplegic  - Consulted vascular cardiology for possible IVC filter. At this time, repeat VA duplex b/l LE showing stable soleal DVT. No indication for IVC filter, continue lovenox  - Will mention in dc summary for repeat DVT scan. Refused scan on 4/9 - B/l below knee carroll vein DVT  - High risk for progression as pt is quadriplegic  - Consulted vascular cardiology for possible IVC filter. At this time, repeat VA duplex b/l LE showing stable soleal DVT. No indication for IVC filter, continue lovenox  - Will mention in dc summary for repeat DVT scan. Refused scan on 4/9 and 4/12

## 2021-04-12 NOTE — DISCHARGE NOTE NURSING/CASE MANAGEMENT/SOCIAL WORK - NSDCVIVACCINE_GEN_ALL_CORE_FT
Severe acute respiratory syndrome coronavirus 2 (SARS-CoV-2) (Coronavirus disease [COVID-19]) vaccine , 2021/4/9 11:50 , Tania Martel (RN)
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
## 2021-04-12 NOTE — BH CONSULTATION LIAISON PROGRESS NOTE - NSBHCONSULTPRIMARYDISCUSSYES_PSY_A_CORE FT
discussed regarding capacity though ethics now has documented that pt has capacity to refuse care.  (Her  had said that she had cognitive decline over the past year and was able to help her with decisions as her proxy).

## 2021-04-12 NOTE — DISCHARGE NOTE NURSING/CASE MANAGEMENT/SOCIAL WORK - NSDCFUADDAPPT_GEN_ALL_CORE_FT
Please follow up with Dr. Libman, neurologist, within 1-2 weeks of discharge from the rehabilitation center. Please follow up within 6 weeks to repeat MRI head and neck for surveillance scan.    Please follow up with Dr. Menard, vascular cardiologist, within 1-2 weeks of discharge from rehabilitation center to monitor soleal deep vein thrombosis.     Please follow up with Dr. Heard, physical rehab physician, within 1-2 weeks of discharge from rehabilitation center.
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
## 2021-04-12 NOTE — DISCHARGE NOTE NURSING/CASE MANAGEMENT/SOCIAL WORK - PATIENT PORTAL LINK FT
You can access the FollowMyHealth Patient Portal offered by [facility] by registering at the following website: [email or web address]. By joining [facility]’s FollowMyHealth portal, you will also be able to view your health information using other applications (apps) compatible with our system.

## 2021-04-13 DIAGNOSIS — I82.409 ACUTE EMBOLISM AND THROMBOSIS OF UNSPECIFIED DEEP VEINS OF UNSPECIFIED LOWER EXTREMITY: ICD-10-CM

## 2021-06-04 PROBLEM — G35 MULTIPLE SCLEROSIS: Chronic | Status: ACTIVE | Noted: 2021-04-02

## 2021-09-23 ENCOUNTER — RESULT CHARGE (OUTPATIENT)
Age: 56
End: 2021-09-23

## 2021-09-23 ENCOUNTER — APPOINTMENT (OUTPATIENT)
Dept: UROGYNECOLOGY | Facility: CLINIC | Age: 56
End: 2021-09-23
Payer: MEDICARE

## 2021-09-23 DIAGNOSIS — R32 UNSPECIFIED URINARY INCONTINENCE: ICD-10-CM

## 2021-09-23 DIAGNOSIS — R14.0 ABDOMINAL DISTENSION (GASEOUS): ICD-10-CM

## 2021-09-23 DIAGNOSIS — N81.9 FEMALE GENITAL PROLAPSE, UNSPECIFIED: ICD-10-CM

## 2021-09-23 LAB
BILIRUB UR QL STRIP: NORMAL
CLARITY UR: CLEAR
COLLECTION METHOD: NORMAL
GLUCOSE UR-MCNC: NORMAL
HCG UR QL: 0.2 EU/DL
HGB UR QL STRIP.AUTO: NORMAL
KETONES UR-MCNC: NORMAL
LEUKOCYTE ESTERASE UR QL STRIP: NORMAL
NITRITE UR QL STRIP: NORMAL
PH UR STRIP: 7.5
PROT UR STRIP-MCNC: NORMAL
SP GR UR STRIP: 1.02

## 2021-09-23 PROCEDURE — 51701 INSERT BLADDER CATHETER: CPT

## 2021-09-23 PROCEDURE — 99215 OFFICE O/P EST HI 40 MIN: CPT | Mod: 25

## 2021-09-23 NOTE — DISCUSSION/SUMMARY
[FreeTextEntry1] : Mrs. GUIDO is 56 with spinal cord injury, poor hip and knee flexibility, pelvic pain, prolapse.  On her initial exam when I had seen her about 9 months ago she had complete procidentia.  Today her prolapse is reduced.  I could not appreciate much.  She is very interested in having definitive surgery to correct her prolapse as when it is out it is very uncomfortable to her.  She does report one of the aides in her rehab center had attempted to place a suppository for her bowels and had difficulty because her prolapse was blocking her rectum.  We discussed her options for prolapse repair as well as nonsurgical for a pessary.  She tried a pessary before and it was very uncomfortable so she does not want to do that again.  We talked about the difference between an abdominal approach with robotic surgery versus a vaginal repair approach.  She has significant concerns about her ovaries.  I had recommended a CT scan months ago which she had never done.  I reordered the CT scan today.  I sent her urine to the lab.  I do think she would be a good candidate for robotic assisted repair.  I have significant concerns about doing a sling as she is unable to get into lithotomy position and therefore I do not think urodynamic testing would benefit us at this point.  I also do not think she would be able to tolerate its. She and her  would like to proceed with surgery so we will start the process. I printed out some literature on SCP.

## 2021-09-23 NOTE — HISTORY OF PRESENT ILLNESS
[FreeTextEntry1] : Had been dealing with significant prolapse. She had a spinal cord injury in April. She believes she had her prolapse reduced while she is the hospital. She has been wheel chair bound since then and in rehab.Her bladder function has been good. She has dealt with constipation and was straining for a BM and her prolapse came back yesterday. She is very uncomfortable now with the prolapse out. Her  reduced the prolapse but she feels it is back. She feels she has increased her abdominal girth and is concerned about something going on inside of her.

## 2021-09-23 NOTE — PROCEDURE
[Urinary Tract Infection] : a urinary tract infection [Straight Catheterization] : insertion of a straight catheter [None] : there were no complications with the catheter insertion [No Complications] : no complications [Tolerated Well] : the patient tolerated the procedure well [Post procedure instructions and information given] : Post procedure instructions and information were given and reviewed with patient.

## 2021-09-27 LAB
APPEARANCE: CLEAR
BACTERIA UR CULT: NORMAL
BACTERIA: NEGATIVE
BILIRUBIN URINE: NEGATIVE
BLOOD URINE: NEGATIVE
COLOR: YELLOW
GLUCOSE QUALITATIVE U: NEGATIVE
HYALINE CASTS: 0 /LPF
KETONES URINE: NEGATIVE
LEUKOCYTE ESTERASE URINE: NEGATIVE
MICROSCOPIC-UA: NORMAL
NITRITE URINE: NEGATIVE
PH URINE: 6
PROTEIN URINE: NEGATIVE
RED BLOOD CELLS URINE: 0 /HPF
SPECIFIC GRAVITY URINE: 1.02
SQUAMOUS EPITHELIAL CELLS: 1 /HPF
UROBILINOGEN URINE: NORMAL
WHITE BLOOD CELLS URINE: 0 /HPF

## 2021-10-11 DIAGNOSIS — R10.2 PELVIC AND PERINEAL PAIN: ICD-10-CM

## 2021-10-29 ENCOUNTER — APPOINTMENT (OUTPATIENT)
Dept: MRI IMAGING | Facility: CLINIC | Age: 56
End: 2021-10-29

## 2021-11-06 ENCOUNTER — APPOINTMENT (OUTPATIENT)
Dept: CT IMAGING | Facility: CLINIC | Age: 56
End: 2021-11-06

## 2021-11-18 ENCOUNTER — APPOINTMENT (OUTPATIENT)
Dept: CT IMAGING | Facility: IMAGING CENTER | Age: 56
End: 2021-11-18

## 2021-11-24 ENCOUNTER — APPOINTMENT (OUTPATIENT)
Dept: UROGYNECOLOGY | Facility: CLINIC | Age: 56
End: 2021-11-24
Payer: MEDICARE

## 2021-11-24 VITALS — DIASTOLIC BLOOD PRESSURE: 69 MMHG | TEMPERATURE: 96.8 F | SYSTOLIC BLOOD PRESSURE: 102 MMHG

## 2021-11-24 DIAGNOSIS — G35 MULTIPLE SCLEROSIS: ICD-10-CM

## 2021-11-24 DIAGNOSIS — N81.2 INCOMPLETE UTEROVAGINAL PROLAPSE: ICD-10-CM

## 2021-11-24 DIAGNOSIS — N81.6 RECTOCELE: ICD-10-CM

## 2021-11-24 DIAGNOSIS — K66.8 OTHER SPECIFIED DISORDERS OF PERITONEUM: ICD-10-CM

## 2021-11-24 DIAGNOSIS — R31.29 OTHER MICROSCOPIC HEMATURIA: ICD-10-CM

## 2021-11-24 DIAGNOSIS — N39.41 URGE INCONTINENCE: ICD-10-CM

## 2021-11-24 PROCEDURE — 99215 OFFICE O/P EST HI 40 MIN: CPT

## 2021-11-24 NOTE — REASON FOR VISIT
[Initial Visit ___] : an initial visit for [unfilled] [Questionnaire Received] : Patient questionnaire received [Intake Form Reviewed] : Patient intake form with past medical history, surgical history, family history and social history reviewed today [Pelvic Organ Prolapse] : pelvic organ prolapse [Urinary Incontinence] : urinary incontinence [Urinary Urgency] : urinary urgency [Spouse] : spouse

## 2021-11-26 NOTE — PHYSICAL EXAM
[Chaperone Present] : A chaperone was present in the examining room during all aspects of the physical examination [No Acute Distress] : in no acute distress [Well developed] : well developed [Bulbocavernous] : bulbocavernous was present [Primitive Reflexes] : primitive reflexes were present [Warm and Dry] : was warm and dry to touch [Turgor Normal] : skin turgor ~T was normal [Labia Majora] : were normal [Labia Minora] : were normal [Normal Appearance] : general appearance was normal [No Bleeding] : there was no active vaginal bleeding [Aa ____] : Aa [unfilled] [Ba ____] : Ba [unfilled] [C ____] : C [unfilled] [TVL ____] : TVL  [unfilled] [Ap ____] : Ap [unfilled] [Bp ____] : Bp [unfilled] [D ____] : D [unfilled] [Normal Position] : in a normal position [Uterine Adnexae] : were not tender and not enlarged [Normal] : no abnormalities [Exam Deferred] : was deferred [Uterine Prolapse] : uterine prolapse [] : III [Mass (___ Cm)] : no ~M [unfilled] abdominal mass was palpated [Tenderness] : ~T no ~M abdominal tenderness observed [Distended] : not distended [Inguinal LAD] : no adenopathy was noted in the inguinal lymph nodes [Normal Gait] : gait was abnormal [de-identified] : wheelchair bound, contractures and limited strength in hands. [de-identified] : widened genital hiatus [de-identified] : irritation of cervix no bleeding no erosion [de-identified] : patient declined cath, PVR recently by Dr Zamarripa 19cc

## 2021-11-26 NOTE — HISTORY OF PRESENT ILLNESS
[] : years ago [Unable To Restrain Bowel Movement] : no [Feelings Of Urinary Urgency] : no [de-identified] : daily, severe [FreeTextEntry4] : occasional when prolapse is irritated [FreeTextEntry5] : daily, severe [de-identified] : leakage with urge [FreeTextEntry1] : Radhika is a 57 yo presenting for a second opinion for management of her prolapse.   She has been previously managed with pessary care by a Dr Salcido, is no longer using a pessary.  She has been evaluated by Dr Zamarripa in January 2021 and March 2016, and had procidentia on her recent examination, with PVR of 19cc.  She had abdominal distension and Dr Zamarripa recommended she get a CTAP, as follows:\par \par CTAP 11/11/21 at [facility]: tricompartmental prolapse.  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  No bladder calculus.  Does not comment on kidneys.\par \par Abdominal Xray 11/3/21: Moderate retained stool, no obstruction pneumatosis or free air, no urinary tract calculi\par \par PMH:NSVDx2, multiple sclerosis, anxiety, recent fall with spinal cord injury in April now wheelchair bound with paralysis of legs and limited function of hands/fingers with hand weakness does not know level of her injury.  History of endometrial polyps.\par PSH: cardiac ablation 1996\par SH: , non smoker, no alcohol

## 2021-11-26 NOTE — DISCUSSION/SUMMARY
[FreeTextEntry1] : Radhika has stage III POP and urinary incontinence.  We reviewed management options for her prolapse including: observation, pelvic floor exercises with and without physical therapy, pessary, and surgical management. We reviewed the different types of surgical procedures including abdominal, vaginal, and robotic/laparoscopic procedures. In addition we reviewed procedures that involve hysterectomy as well as surgeries with uterine preservation. Mesh and non-mesh options were reviewed. [facility] information on prolapse and stress incontinence was given to her.  She is interested in surgical management for her prolapse.  Given her urinary symptoms we requested she get urodynamics.  For the postmenopausal bleeding and history of uterine polyps we requested she get a transvaginal ultrasound. We referred her to general surgery for evaluation of the cyst.  She will follow up for further discussion after this work up.  \par \par

## 2021-12-08 ENCOUNTER — APPOINTMENT (OUTPATIENT)
Dept: UROGYNECOLOGY | Facility: CLINIC | Age: 56
End: 2021-12-08

## 2024-01-04 NOTE — SWALLOW BEDSIDE ASSESSMENT ADULT - SWALLOW EVAL: RECOMMENDED FEEDING/EATING TECHNIQUES
Pt is permitted to have ice chips between meals following provision of oral care/maintain upright posture during/after eating for 30 mins/position upright (90 degrees) Resident

## 2024-12-16 NOTE — CONSULT NOTE ADULT - TIME BILLING
55-year-old ? handed lady first evaluated at [facility] on 4/3/2021 with quadriplegia and an occluded right vertebral artery.  History and exam as above.  ROS otherwise negative.  MRA neck and head (4/2/2021) to my eye showed that the right vertebral artery was occluded at the mid-distal V2 segment.  MRI cervical spine (4/2/2021) to my eye showed increased signal in the cord at C3/4, thought to represent a contusion with minor hemorrhage.  Impression.  On 4/2/2021 she had a sudden hyperextension injury of her neck, felt tingling in all 4 distal extremities and fell, with subsequent quadriplegia.  MRA neck suggested that the right vertebral artery was occluded possibly in the mid-distal V2 segment, although the occlusion could theoretically be more distal.  It's possible that she had a right vertebral artery dissection which apparently was asymptomatic, along with a probable cervical cord contusion.  The risk/benefit ratio of using aspirin for the presumed dissection in this case is unknown, as I am uncertain as to the risk of exacerbating the hemorrhagic component of her presumed cord contusion.  Suggest.  Aspirin 81 mg daily after consultation and opinion from neurosurgery regarding risks with respect to her cervical cord.
English

## 2025-01-21 ENCOUNTER — NON-APPOINTMENT (OUTPATIENT)
Age: 60
End: 2025-01-21